# Patient Record
Sex: FEMALE | Race: WHITE | NOT HISPANIC OR LATINO | Employment: OTHER | URBAN - METROPOLITAN AREA
[De-identification: names, ages, dates, MRNs, and addresses within clinical notes are randomized per-mention and may not be internally consistent; named-entity substitution may affect disease eponyms.]

---

## 2020-02-19 RX ORDER — BISACODYL 10 MG
10 SUPPOSITORY, RECTAL RECTAL
COMMUNITY

## 2020-02-19 RX ORDER — METOPROLOL SUCCINATE 50 MG/1
50 TABLET, EXTENDED RELEASE ORAL DAILY
COMMUNITY
Start: 2019-10-25 | End: 2022-07-18

## 2020-02-19 RX ORDER — LISINOPRIL 20 MG/1
20 TABLET ORAL 2 TIMES DAILY
COMMUNITY
Start: 2020-01-02 | End: 2022-07-18

## 2020-02-19 RX ORDER — FAMOTIDINE 40 MG/1
TABLET, FILM COATED ORAL
COMMUNITY

## 2020-02-19 RX ORDER — LOSARTAN POTASSIUM AND HYDROCHLOROTHIAZIDE 12.5; 1 MG/1; MG/1
TABLET ORAL
COMMUNITY

## 2020-02-19 RX ORDER — ALENDRONATE SODIUM 70 MG/1
TABLET ORAL
COMMUNITY
Start: 2019-06-23

## 2020-02-19 RX ORDER — BUTALBITAL, ACETAMINOPHEN AND CAFFEINE 300; 40; 50 MG/1; MG/1; MG/1
CAPSULE ORAL
COMMUNITY

## 2020-02-19 RX ORDER — ATORVASTATIN CALCIUM 40 MG/1
TABLET, FILM COATED ORAL
COMMUNITY
Start: 2019-10-25

## 2020-02-19 RX ORDER — CLONIDINE HYDROCHLORIDE 0.1 MG/1
TABLET ORAL
COMMUNITY

## 2020-02-19 RX ORDER — HYDROCHLOROTHIAZIDE 25 MG/1
25 TABLET ORAL DAILY
COMMUNITY
Start: 2019-10-25 | End: 2022-07-18

## 2020-02-19 RX ORDER — TOPIRAMATE 50 MG/1
TABLET, FILM COATED ORAL
COMMUNITY
Start: 2018-08-13

## 2020-02-19 RX ORDER — PANTOPRAZOLE SODIUM 40 MG/1
TABLET, DELAYED RELEASE ORAL
COMMUNITY
Start: 2019-06-23

## 2020-02-19 RX ORDER — MELOXICAM 7.5 MG/1
7.5 TABLET ORAL DAILY
COMMUNITY
Start: 2019-06-23 | End: 2022-07-18

## 2020-02-20 ENCOUNTER — OFFICE VISIT (OUTPATIENT)
Dept: OBGYN CLINIC | Facility: CLINIC | Age: 75
End: 2020-02-20
Payer: COMMERCIAL

## 2020-02-20 VITALS — DIASTOLIC BLOOD PRESSURE: 93 MMHG | SYSTOLIC BLOOD PRESSURE: 137 MMHG | HEART RATE: 62 BPM | HEIGHT: 61 IN

## 2020-02-20 DIAGNOSIS — M25.462 EFFUSION OF LEFT KNEE: ICD-10-CM

## 2020-02-20 DIAGNOSIS — M25.562 LEFT KNEE PAIN, UNSPECIFIED CHRONICITY: ICD-10-CM

## 2020-02-20 DIAGNOSIS — M17.12 PRIMARY OSTEOARTHRITIS OF LEFT KNEE: Primary | ICD-10-CM

## 2020-02-20 PROCEDURE — 99203 OFFICE O/P NEW LOW 30 MIN: CPT | Performed by: ORTHOPAEDIC SURGERY

## 2020-02-20 PROCEDURE — 20610 DRAIN/INJ JOINT/BURSA W/O US: CPT | Performed by: ORTHOPAEDIC SURGERY

## 2020-02-20 RX ORDER — LIDOCAINE HYDROCHLORIDE 10 MG/ML
1 INJECTION, SOLUTION INFILTRATION; PERINEURAL
Status: COMPLETED | OUTPATIENT
Start: 2020-02-20 | End: 2020-02-20

## 2020-02-20 RX ORDER — BETAMETHASONE SODIUM PHOSPHATE AND BETAMETHASONE ACETATE 3; 3 MG/ML; MG/ML
12 INJECTION, SUSPENSION INTRA-ARTICULAR; INTRALESIONAL; INTRAMUSCULAR; SOFT TISSUE
Status: COMPLETED | OUTPATIENT
Start: 2020-02-20 | End: 2020-02-20

## 2020-02-20 RX ORDER — BUPIVACAINE HYDROCHLORIDE 5 MG/ML
2 INJECTION, SOLUTION EPIDURAL; INTRACAUDAL
Status: COMPLETED | OUTPATIENT
Start: 2020-02-20 | End: 2020-02-20

## 2020-02-20 RX ADMIN — BETAMETHASONE SODIUM PHOSPHATE AND BETAMETHASONE ACETATE 12 MG: 3; 3 INJECTION, SUSPENSION INTRA-ARTICULAR; INTRALESIONAL; INTRAMUSCULAR; SOFT TISSUE at 08:34

## 2020-02-20 RX ADMIN — BUPIVACAINE HYDROCHLORIDE 2 ML: 5 INJECTION, SOLUTION EPIDURAL; INTRACAUDAL at 08:34

## 2020-02-20 RX ADMIN — LIDOCAINE HYDROCHLORIDE 1 ML: 10 INJECTION, SOLUTION INFILTRATION; PERINEURAL at 08:34

## 2020-02-20 NOTE — PROGRESS NOTES
Assessment/Plan:  1  Primary osteoarthritis of left knee  Large joint arthrocentesis   2  Left knee pain, unspecified chronicity     3  Effusion of left knee  Large joint arthrocentesis     There is no problem list on file for this patient  Discussion/Summary:    69 y o  female  Left knee pain and effusion likely secondary to arthritic flare  20 cc of clear synovial fluid were aspirated today cortisone injection was delivered  Instructed patient give this injection up to 2 weeks to work if she is still having significant pain after 2 weeks she will follow up otherwise follow-up as needed  The patient was seen and examined by Dr Nyla Yung and myself  The assessment and plan were formulated by Dr Nyla Yung and I assisted in carrying it out  Subjective:   Patient ID: Mackenzie Zhao is a 69 y o  female   HPI    Patient presents to the office complaining of left knee pain  Symptoms began a month ago was walking the dogs and fell directly onto the leg/shin had immediate pain saw the [facility] ED had XRs that were read as negative for fracture, diagnosed  Pain is located anteromedial knee pain  Pain is described as stabbing and sharp, intermittent  The pain does not radiate   The pain is 8/10 at worst, 3/10 at best  It is made worse by flexion, weight bearing for extended time, she walks with a cane  It is made better by rest and elevation  So far the patient has tried ACE wrap, icy hot doesn't help, tylenol without relief, no NSAIDs she cant take those  The patient denies past episodes similar to this  The patient denies any numbness or tingling in the leg      The following portions of the patient's history were reviewed and updated as appropriate: allergies, current medications, past family history, past social history, past surgical history and problem list     Social History     Socioeconomic History    Marital status: Unknown     Spouse name: Not on file    Number of children: Not on file    Years of education: Not on file    Highest education level: Not on file   Occupational History    Not on file   Social Needs    Financial resource strain: Not on file    Food insecurity:     Worry: Not on file     Inability: Not on file    Transportation needs:     Medical: Not on file     Non-medical: Not on file   Tobacco Use    Smoking status: Never Smoker   Substance and Sexual Activity    Alcohol use: Not on file    Drug use: Not on file    Sexual activity: Not on file   Lifestyle    Physical activity:     Days per week: Not on file     Minutes per session: Not on file    Stress: Not on file   Relationships    Social connections:     Talks on phone: Not on file     Gets together: Not on file     Attends [employer] service: Not on file     Active member of club or organization: Not on file     Attends meetings of clubs or organizations: Not on file     Relationship status: Not on file    Intimate partner violence:     Fear of current or ex partner: Not on file     Emotionally abused: Not on file     Physically abused: Not on file     Forced sexual activity: Not on file   Other Topics Concern    Not on file   Social History Narrative    Not on file     No past medical history on file  No past surgical history on file    No Known Allergies  Current Outpatient Medications on File Prior to Visit   Medication Sig Dispense Refill    alendronate (FOSAMAX) 70 mg tablet alendronate 70 mg tablet      atorvastatin (LIPITOR) 40 mg tablet atorvastatin 40 mg tablet      hydrochlorothiazide (HYDRODIURIL) 25 mg tablet Take 25 mg by mouth daily      lisinopril (ZESTRIL) 20 mg tablet Take 20 mg by mouth 2 (two) times a day      magnesium oxide (MagOx 400) 400 mg Take 400 mg by mouth 2 (two) times a day      meloxicam (MOBIC) 7 5 mg tablet Take 7 5 mg by mouth daily      metoprolol succinate (TOPROL-XL) 50 mg 24 hr tablet Take 50 mg by mouth daily      pantoprazole (PROTONIX) 40 mg tablet pantoprazole 40 mg tablet,delayed release      topiramate (TOPAMAX) 50 MG tablet Take 1 tab nightly for a week and then 1 tab twice a day      bisacodyl (DULCOLAX) 10 mg suppository Insert 10 mg into the rectum      Butalbital-APAP-Caffeine -40 MG CAPS butalbital-acetaminophen-caffeine 50 mg-300 mg-40 mg capsule      cloNIDine (CATAPRES) 0 1 mg tablet clonidine HCl 0 1 mg tablet      famotidine (PEPCID) 40 MG tablet famotidine 40 mg tablet      losartan-hydrochlorothiazide (HYZAAR) 100-12 5 MG per tablet losartan 100 mg-hydrochlorothiazide 12 5 mg tablet       No current facility-administered medications on file prior to visit  Review of Systems   Constitutional: Negative for chills, fever and unexpected weight change  HENT: Negative for hearing loss, nosebleeds and sore throat  Eyes: Negative for pain, redness and visual disturbance  Respiratory: Negative for cough, shortness of breath and wheezing  Cardiovascular: Positive for leg swelling  Negative for chest pain and palpitations  Gastrointestinal: Negative for abdominal pain, nausea and vomiting  Endocrine: Positive for polydipsia and polyuria (attributes this to drinking lots of water)  Genitourinary: Negative for dysuria and hematuria  Musculoskeletal:         As noted in HPI   Skin: Negative for rash and wound  Neurological: Positive for headaches (being treated by PCP)  Negative for dizziness and numbness  Psychiatric/Behavioral: Negative for decreased concentration, dysphoric mood and suicidal ideas  The patient is not nervous/anxious  Objective:    Vitals:    02/20/20 0756   BP: 137/93   Pulse: 62       Physical Exam   Constitutional: She is oriented to person, place, and time  She appears well-developed and well-nourished  HENT:   Head: Normocephalic and atraumatic  Eyes: Conjunctivae are normal  No scleral icterus  Neck: Neck supple     Cardiovascular:   No discernible arrhthymias   Pulmonary/Chest: Effort normal  No stridor  No respiratory distress  Abdominal: Soft  She exhibits no distension  Musculoskeletal:        Left knee: She exhibits effusion  Neurological: She is alert and oriented to person, place, and time  Skin: Skin is warm and dry  No erythema  Psychiatric: She has a normal mood and affect  Her behavior is normal        Left Knee Exam     Muscle Strength   The patient has normal left knee strength  Tenderness   The patient is experiencing tenderness in the medial joint line  Range of Motion   The patient has normal left knee ROM  Tests   Brayden:  Medial - positive Lateral - negative  Varus: negative Valgus: negative    Other   Erythema: absent  Scars: absent  Sensation: normal  Pulse: present  Swelling: mild  Effusion: effusion present    Comments:  No ecchymosis or deformity   no warmth no ecchymosis skin is intact            I have personally reviewed pertinent films in PACS and my interpretation is   Outside x-rays of the left knee demonstrate no acute fracture, small joint effusion, mild degenerative changes in the medial compartment with osteophyte formation seen      Large joint arthrocentesis: L knee  Date/Time: 2/20/2020 8:34 AM  Consent given by: patient  Site marked: site marked  Timeout: Immediately prior to procedure a time out was called to verify the correct patient, procedure, equipment, support staff and site/side marked as required   Supporting Documentation  Indications: pain   Procedure Details  Location: knee - L knee  Preparation: Patient was prepped and draped in the usual sterile fashion  Needle size: 22 G  Approach: anterolateral  Medications administered: 1 mL lidocaine 1 %; 12 mg betamethasone acetate-betamethasone sodium phosphate 6 (3-3) mg/mL; 2 mL bupivacaine (PF) 0 5 %    Aspirate amount: 20 mL  Aspirate: clear and yellow    Patient tolerance: patient tolerated the procedure well with no immediate complications  Dressing:  Sterile dressing applied Portions of the record may have been created with voice recognition software  Occasional wrong word or "sound a like" substitutions may have occurred due to the inherent limitations of voice recognition software  Read the chart carefully and recognize, using context, where substitutions have occurred

## 2020-03-20 ENCOUNTER — TELEPHONE (OUTPATIENT)
Dept: OBGYN CLINIC | Facility: CLINIC | Age: 75
End: 2020-03-20

## 2020-03-20 NOTE — TELEPHONE ENCOUNTER
----- Message from [facility] sent at 3/20/2020  9:57 AM EDT -----  Regarding: FOLLOW UP  Patient would like someone to call her in reference to when she can be scheduled for a follow up visit for her knee  Last saw you in Feb, she said she had her knee drained, however it is still bothering her   Instructed to send a task to you per Claudette to make a decision on her follow up request  Thanks

## 2020-06-10 ENCOUNTER — OFFICE VISIT (OUTPATIENT)
Dept: OBGYN CLINIC | Facility: CLINIC | Age: 75
End: 2020-06-10
Payer: COMMERCIAL

## 2020-06-10 VITALS
BODY MASS INDEX: 34.21 KG/M2 | HEART RATE: 55 BPM | SYSTOLIC BLOOD PRESSURE: 107 MMHG | HEIGHT: 61 IN | WEIGHT: 181.2 LBS | DIASTOLIC BLOOD PRESSURE: 66 MMHG

## 2020-06-10 DIAGNOSIS — M22.2X2 PATELLOFEMORAL SYNDROME OF BOTH KNEES: ICD-10-CM

## 2020-06-10 DIAGNOSIS — M17.12 PRIMARY OSTEOARTHRITIS OF LEFT KNEE: Primary | ICD-10-CM

## 2020-06-10 DIAGNOSIS — M22.2X1 PATELLOFEMORAL SYNDROME OF BOTH KNEES: ICD-10-CM

## 2020-06-10 DIAGNOSIS — M62.9 HAMSTRING TIGHTNESS OF BOTH LOWER EXTREMITIES: ICD-10-CM

## 2020-06-10 DIAGNOSIS — R29.898 WEAKNESS OF BOTH HIPS: ICD-10-CM

## 2020-06-10 PROCEDURE — 20610 DRAIN/INJ JOINT/BURSA W/O US: CPT | Performed by: FAMILY MEDICINE

## 2020-06-10 PROCEDURE — 99213 OFFICE O/P EST LOW 20 MIN: CPT | Performed by: FAMILY MEDICINE

## 2020-06-10 RX ORDER — TRIAMCINOLONE ACETONIDE 40 MG/ML
40 INJECTION, SUSPENSION INTRA-ARTICULAR; INTRAMUSCULAR
Status: COMPLETED | OUTPATIENT
Start: 2020-06-10 | End: 2020-06-10

## 2020-06-10 RX ORDER — LIDOCAINE HYDROCHLORIDE 10 MG/ML
4 INJECTION, SOLUTION INFILTRATION; PERINEURAL
Status: COMPLETED | OUTPATIENT
Start: 2020-06-10 | End: 2020-06-10

## 2020-06-10 RX ORDER — LIDOCAINE HYDROCHLORIDE 10 MG/ML
5 INJECTION, SOLUTION INFILTRATION; PERINEURAL
Status: COMPLETED | OUTPATIENT
Start: 2020-06-10 | End: 2020-06-10

## 2020-06-10 RX ORDER — ASPIRIN 81 MG/1
81 TABLET, CHEWABLE ORAL DAILY
COMMUNITY
Start: 2020-03-06 | End: 2022-07-18

## 2020-06-10 RX ADMIN — LIDOCAINE HYDROCHLORIDE 4 ML: 10 INJECTION, SOLUTION INFILTRATION; PERINEURAL at 14:30

## 2020-06-10 RX ADMIN — TRIAMCINOLONE ACETONIDE 40 MG: 40 INJECTION, SUSPENSION INTRA-ARTICULAR; INTRAMUSCULAR at 14:30

## 2020-06-10 RX ADMIN — LIDOCAINE HYDROCHLORIDE 5 ML: 10 INJECTION, SOLUTION INFILTRATION; PERINEURAL at 14:30

## 2020-07-08 ENCOUNTER — EVALUATION (OUTPATIENT)
Dept: PHYSICAL THERAPY | Facility: CLINIC | Age: 75
End: 2020-07-08
Payer: COMMERCIAL

## 2020-07-08 DIAGNOSIS — M17.12 PRIMARY OSTEOARTHRITIS OF LEFT KNEE: ICD-10-CM

## 2020-07-08 DIAGNOSIS — R29.898 WEAKNESS OF BOTH HIPS: ICD-10-CM

## 2020-07-08 DIAGNOSIS — M62.9 HAMSTRING TIGHTNESS OF BOTH LOWER EXTREMITIES: ICD-10-CM

## 2020-07-08 DIAGNOSIS — M22.2X1 PATELLOFEMORAL SYNDROME OF BOTH KNEES: ICD-10-CM

## 2020-07-08 DIAGNOSIS — M22.2X2 PATELLOFEMORAL SYNDROME OF BOTH KNEES: ICD-10-CM

## 2020-07-08 PROCEDURE — 97162 PT EVAL MOD COMPLEX 30 MIN: CPT | Performed by: PHYSICAL THERAPIST

## 2020-07-08 PROCEDURE — 97110 THERAPEUTIC EXERCISES: CPT | Performed by: PHYSICAL THERAPIST

## 2020-07-08 NOTE — PROGRESS NOTES
PT Evaluation     Today's date: 2020  Patient name: Velia Lay  : [birth year]  MRN: [id number]  Referring provider: Prachi Saavedra DO  Dx:   Encounter Diagnosis     ICD-10-CM    1  Primary osteoarthritis of left knee M17 12 Ambulatory referral to Physical Therapy   2  Patellofemoral syndrome of both knees M22 2X1 Ambulatory referral to Physical Therapy    M22 2X2    3  Weakness of both hips R29 898 Ambulatory referral to Physical Therapy   4  Hamstring tightness of both lower extremities M62 9 Ambulatory referral to Physical Therapy                  Assessment  Assessment details: Patient is a 70 y/o female with chief complaints of left knee pain following a fall in 2020  Patient presents with decreased functional mobility due to increased pain, decreased strength, decreased knee ROM, gait deviations requiring SPC associated with knee OA  Cannot rule out ligamentous or meniscal injury at this time  Patient will benefit from skilled physical therapy to address impairment and improve functional mobility  PT needed to allow for return to maximal function and improve quality of life  Impairments: abnormal or restricted ROM, activity intolerance, impaired physical strength, lacks appropriate home exercise program and pain with function  Understanding of Dx/Px/POC: good   Prognosis: good    Goals  STG within 4 weeks:   1  Patient to be independent in HEP  2  Reduce pain by 50% to improve quality of life  3  Patient to improve left knee flexion to 110 degrees  4  Improve left knee extension to 0 degrees  LTG within 8 weeks:   1  Patient to be independent in ADLs/IADLs without difficulty  2  Patient to be able to ambulate community distances with minimal difficulty  3  Patient to be able to perform reciprocal stairs with minimal difficulty       Plan  Patient would benefit from: skilled physical therapy and PT eval  Planned modality interventions: cryotherapy, hydrotherapy and unattended electrical stimulation  Planned therapy interventions: therapeutic training, therapeutic exercise, therapeutic activities, stretching, strengthening, postural training, patient education, neuromuscular re-education, manual therapy, joint mobilization, IADL retraining, activity modification, ADL retraining, ADL training, body mechanics training, flexibility, functional ROM exercises, gait training, graded activity, graded exercise, graded motor and home exercise program  Frequency: 2x week  Duration in weeks: 8  Plan of Care beginning date: 2020  Plan of Care expiration date: 2020  Treatment plan discussed with: patient        Subjective Evaluation    History of Present Illness  Mechanism of injury: Patient is a 68 y/o female s/p a fall in 2020 when she was walking her dogs  She states she went to the ER on 2020 and xray was negative for fracture  She was referred to ortho on 20 and had fluid drained from her knee  She was seen again by ortho on 6/10/20 and had fluid drained and a cortisone injection  She states that helped for about two weeks  She states she feels a lot of pressure in her knee and cramping  Prior to this, she denies any AD  Today, she ambulates with SPC  She reports clicking and occasional giving out of the knee  She is referred for evaluation and treatment of left knee pain  Pain  Current pain ratin  At best pain ratin  At worst pain ratin  Quality: cramping, pressure and sharp  Progression: worsening    Social Support  Steps to enter house: yes  Stairs in house: no   Lives in: apartment  Lives with: patient's oldest granddaugther     Employment status: working  Hand dominance: left  Exercise history: walking the dogs   Life stress: medium       Diagnostic Tests  Abnormal x-ray: No xray report available, but patient states she was diagnosed with osteoarthritis    Treatments  Previous treatment: injection treatment and medication  Patient Goals  Patient goals for therapy: decreased pain, increased motion, increased strength and independence with ADLs/IADLs  Patient goal: "to be able to walk better"         Objective     Active Range of Motion   Left Knee   Flexion: 65 degrees   Extension: -10 degrees   Extensor lag: -15 degrees     Right Knee   Flexion: 95 degrees   Extension: -5 degrees     Additional Active Range of Motion Details  Unable to perform SLR on right     Strength/Myotome Testing     Left Knee   Flexion: 3  Extension: 3  Quadriceps contraction: poor    Right Knee   Flexion: 4+  Extension: 4+  Quadriceps contraction: fair    Additional Strength Details  Pain with active extension and flexion; pain with resistance testing    Tests     Additional Tests Details  Unable to perform special testing due to significant pain during assessment and time constraint during assessment  Flowsheet Rows      Most Recent Value   PT/OT G-Codes   Current Score  50   Projected Score  62             Precautions: language barrier, high cholesterol, high blood pressure, h/o MI and cardiac bypass, patient tolerance     Increased time spent on patient education with diagnosis, prognosis, goals of therapy, progression of therapy, and plan of care  All questions answered  Patient instructed to call clinic with questions or concerns        Manuals 7/8                                                                Neuro Re-Ed             Quad set 3"x20            Towel crush 3"x20                                                                             Ther Ex             Gastroc stretch w strap 4x15"            Recumbent bike (NV) (start at 5 min)           Hamstring stretch  NV            NV: adductor set, glute set, SLR, clamshell, HR NV                                                                Ther Activity                                       Gait Training                                       Modalities             Cold pack- L knee decline Assessment: Patient has no complaints post session  She declines ice, but instructed to ice at home 2-3 times per day, 10-15 minutes

## 2020-07-10 ENCOUNTER — OFFICE VISIT (OUTPATIENT)
Dept: PHYSICAL THERAPY | Facility: CLINIC | Age: 75
End: 2020-07-10
Payer: COMMERCIAL

## 2020-07-10 DIAGNOSIS — R29.898 WEAKNESS OF BOTH HIPS: ICD-10-CM

## 2020-07-10 DIAGNOSIS — M22.2X1 PATELLOFEMORAL SYNDROME OF BOTH KNEES: ICD-10-CM

## 2020-07-10 DIAGNOSIS — M22.2X2 PATELLOFEMORAL SYNDROME OF BOTH KNEES: ICD-10-CM

## 2020-07-10 DIAGNOSIS — M62.9 HAMSTRING TIGHTNESS OF BOTH LOWER EXTREMITIES: ICD-10-CM

## 2020-07-10 DIAGNOSIS — M17.12 PRIMARY OSTEOARTHRITIS OF LEFT KNEE: Primary | ICD-10-CM

## 2020-07-10 PROCEDURE — 97140 MANUAL THERAPY 1/> REGIONS: CPT | Performed by: PHYSICAL THERAPIST

## 2020-07-10 PROCEDURE — 97110 THERAPEUTIC EXERCISES: CPT | Performed by: PHYSICAL THERAPIST

## 2020-07-10 NOTE — PROGRESS NOTES
Daily Note     Today's date: 7/10/2020  Patient name: Naveen Blanton  :   MRN: [id number]  Referring provider: Nicky Chahal DO  Dx:   Encounter Diagnosis     ICD-10-CM    1  Primary osteoarthritis of left knee M17 12    2  Patellofemoral syndrome of both knees M22 2X1     M22 2X2    3  Weakness of both hips R29 898    4  Hamstring tightness of both lower extremities M62 9                   Subjective: Patient denies any new complaints  Objective: See treatment diary below      Assessment: Tolerated treatment well  Unable to make full revolutions on bike, but improves to 100 degrees of left knee flexion with heel slides  She exhibits tenderness to left medial knee, but good response to IASTM  Less pain from start to finish of session  Patient would benefit from continued PT      Plan: Continue per plan of care        Precautions: language barrier, high cholesterol, high blood pressure, h/o MI and cardiac bypass, patient tolerance         Manuals 7/8 7/10           IASTM L medial knee  8'                                                  Neuro Re-Ed             Quad set 3"x20 3"x20           Towel crush 3"x20                                                                             Ther Ex             Gastroc stretch w strap 4x15" 4x15"           Recumbent bike rocking  (NV) 5 min           Hamstring stretch  NV By PT 5 x 20"           SLR Left   w PT assist 2 x 10           LAQ  2x10 w strap assist           Heel slides  x15           NV: adductor set, glute set,  clamshell, HR NV                                                                Ther Activity                                       Gait Training                                       Modalities             Cold pack- L knee decline 10'

## 2020-07-13 ENCOUNTER — OFFICE VISIT (OUTPATIENT)
Dept: PHYSICAL THERAPY | Facility: CLINIC | Age: 75
End: 2020-07-13
Payer: COMMERCIAL

## 2020-07-13 DIAGNOSIS — M17.12 PRIMARY OSTEOARTHRITIS OF LEFT KNEE: Primary | ICD-10-CM

## 2020-07-13 DIAGNOSIS — M62.9 HAMSTRING TIGHTNESS OF BOTH LOWER EXTREMITIES: ICD-10-CM

## 2020-07-13 DIAGNOSIS — M22.2X1 PATELLOFEMORAL SYNDROME OF BOTH KNEES: ICD-10-CM

## 2020-07-13 DIAGNOSIS — M22.2X2 PATELLOFEMORAL SYNDROME OF BOTH KNEES: ICD-10-CM

## 2020-07-13 DIAGNOSIS — R29.898 WEAKNESS OF BOTH HIPS: ICD-10-CM

## 2020-07-13 PROCEDURE — 97110 THERAPEUTIC EXERCISES: CPT | Performed by: PHYSICAL THERAPIST

## 2020-07-13 PROCEDURE — 97140 MANUAL THERAPY 1/> REGIONS: CPT | Performed by: PHYSICAL THERAPIST

## 2020-07-13 NOTE — PROGRESS NOTES
Daily Note     Today's date: 2020  Patient name: Frida Akbar  :   MRN: [id number]  Referring provider: Duane Javier DO  Dx:   Encounter Diagnosis     ICD-10-CM    1  Primary osteoarthritis of left knee M17 12    2  Patellofemoral syndrome of both knees M22 2X1     M22 2X2    3  Weakness of both hips R29 898    4  Hamstring tightness of both lower extremities M62 9                   Subjective: Patient states she felt better for a day after last visit  She reports her knee is still clicking  Objective: See treatment diary below      Assessment: Tolerated treatment well  Patient able to make full revolutions on bike this visit, which is improved from last visit  She achieves 112 degrees of knee flexion this visit  Most difficulty with open chain quad exercises  Held on SAQ due to pain  Patient would benefit from continued PT Less pain from start to finish of session  Plan: Continue per plan of care        Precautions: language barrier, high cholesterol, high blood pressure, h/o MI and cardiac bypass, patient tolerance         Manuals 7/8 7/10 7/13          IASTM L medial knee  8' 8'                                                 Neuro Re-Ed             Quad set 3"x20 3"x20 3"x20          Towel crush 3"x20  3"X20          SAQ   unable                                                              Ther Ex             Gastroc stretch w strap 4x15" 4x15" 4x15"          Recumbent bike rocking  (NV) 5 min Full rev 5 min          Hamstring stretch  NV By PT 5 x 20"           SLR Left   w PT assist 2 x 10 w strap 2 x 10          LAQ  2x10 w strap assist w strap 2 x 10          Heel slides  x15 5"x15           adductor set NV  3"x20          Heel raises   x20          Standing hamstring curl   x20           Standing hip abd   2x10                       Ther Activity                                       Gait Training                                       Modalities Cold pack- L knee decline 10' 10'

## 2020-07-15 ENCOUNTER — OFFICE VISIT (OUTPATIENT)
Dept: PHYSICAL THERAPY | Facility: CLINIC | Age: 75
End: 2020-07-15
Payer: COMMERCIAL

## 2020-07-15 DIAGNOSIS — M62.9 HAMSTRING TIGHTNESS OF BOTH LOWER EXTREMITIES: ICD-10-CM

## 2020-07-15 DIAGNOSIS — M17.12 PRIMARY OSTEOARTHRITIS OF LEFT KNEE: Primary | ICD-10-CM

## 2020-07-15 DIAGNOSIS — R29.898 WEAKNESS OF BOTH HIPS: ICD-10-CM

## 2020-07-15 DIAGNOSIS — M22.2X1 PATELLOFEMORAL SYNDROME OF BOTH KNEES: ICD-10-CM

## 2020-07-15 DIAGNOSIS — M22.2X2 PATELLOFEMORAL SYNDROME OF BOTH KNEES: ICD-10-CM

## 2020-07-15 PROCEDURE — 97110 THERAPEUTIC EXERCISES: CPT

## 2020-07-15 NOTE — PROGRESS NOTES
Daily Note     Today's date: 7/15/2020  Patient name: Velia Lay  :   MRN: [id number]  Referring provider: Prachi Saavedra DO  Dx:   Encounter Diagnosis     ICD-10-CM    1  Primary osteoarthritis of left knee M17 12    2  Patellofemoral syndrome of both knees M22 2X1     M22 2X2    3  Weakness of both hips R29 898    4  Hamstring tightness of both lower extremities M62 9                   Subjective: Pt arrived 30 minutes late and was accommodated  Pt states she is feeling a bit better but continues to be very sore but she is not needing her cane as much anymore  Objective: See treatment diary below      Assessment: Pt progressed through exercises with remaining time of her appointment  Pt demonstrates improvements as she was able to tolerated increased pressure with IASTM and was able to ambulate around facility with no cane  Pt will finish the remaining exercisess at home  Pt will resume full program next session  Pt would continue to benefit from PT  Plan: Continue per plan of care        Precautions: language barrier, high cholesterol, high blood pressure, h/o MI and cardiac bypass, patient tolerance         Manuals 7/8 7/10 7/13 7/15          IASTM L medial knee  8' 8' CF 8'                                                 Neuro Re-Ed             Quad set 3"x20 3"x20 3"x20 3" x 20          Towel crush 3"x20  3"X20 nv         SAQ   unable                                                              Ther Ex             Gastroc stretch w strap 4x15" 4x15" 4x15" 4 x15"         Recumbent bike rocking  (NV) 5 min Full rev 5 min Full 5 min         Hamstring stretch  NV By PT 5 x 20"           SLR Left   w PT assist 2 x 10 w strap 2 x 10 nv         LAQ  2x10 w strap assist w strap 2 x 10 nv         Heel slides  x15 5"x15 5" x 15           adductor set NV  3"x20 nv         Heel raises   x20 20x         Standing hamstring curl   x20  20x          Standing hip abd   2x10 2 x 10 Ther Activity                                       Gait Training                                       Modalities             Cold pack- L knee decline 10' 10' 10'

## 2020-07-17 ENCOUNTER — APPOINTMENT (OUTPATIENT)
Dept: PHYSICAL THERAPY | Facility: CLINIC | Age: 75
End: 2020-07-17
Payer: COMMERCIAL

## 2020-07-20 ENCOUNTER — OFFICE VISIT (OUTPATIENT)
Dept: PHYSICAL THERAPY | Facility: CLINIC | Age: 75
End: 2020-07-20
Payer: COMMERCIAL

## 2020-07-20 DIAGNOSIS — M22.2X1 PATELLOFEMORAL SYNDROME OF BOTH KNEES: ICD-10-CM

## 2020-07-20 DIAGNOSIS — M62.9 HAMSTRING TIGHTNESS OF BOTH LOWER EXTREMITIES: ICD-10-CM

## 2020-07-20 DIAGNOSIS — R29.898 WEAKNESS OF BOTH HIPS: ICD-10-CM

## 2020-07-20 DIAGNOSIS — M22.2X2 PATELLOFEMORAL SYNDROME OF BOTH KNEES: ICD-10-CM

## 2020-07-20 DIAGNOSIS — M17.12 PRIMARY OSTEOARTHRITIS OF LEFT KNEE: Primary | ICD-10-CM

## 2020-07-20 PROCEDURE — 97112 NEUROMUSCULAR REEDUCATION: CPT | Performed by: PHYSICAL THERAPIST

## 2020-07-20 PROCEDURE — 97140 MANUAL THERAPY 1/> REGIONS: CPT | Performed by: PHYSICAL THERAPIST

## 2020-07-20 PROCEDURE — 97110 THERAPEUTIC EXERCISES: CPT | Performed by: PHYSICAL THERAPIST

## 2020-07-20 NOTE — PROGRESS NOTES
Daily Note     Today's date: 2020  Patient name: Jacqueline Segovia  :   MRN: [id number]  Referring provider: Armen Rodney DO  Dx:   Encounter Diagnosis     ICD-10-CM    1  Primary osteoarthritis of left knee M17 12    2  Patellofemoral syndrome of both knees M22 2X1     M22 2X2    3  Weakness of both hips R29 898    4  Hamstring tightness of both lower extremities M62 9                   Subjective: Pt states she is doing much better  She no longer wraps her knee and no longer uses a cane for ambulation  She is having less pain, but remaining pain is located at medial aspect of left knee  Objective: See treatment diary below      Assessment: Patient now able to perform LAQ and SLR without strap assist, indicating improved strength  Improved ROM noted as well, during heel slides and quad sets  Updated FOTO, which showed significantly improved functional mobility  Discussed continuation of skilled PT for several more sessions to continue to implement HEP, prior to d/c to HEP  Patient should follow up with referring physician for viscosupplementation as he recommended previously, if indicated  Plan: Continue per plan of care        Precautions: language barrier, high cholesterol, high blood pressure, h/o MI and cardiac bypass, patient tolerance         Manuals 7/8 7/10 7/13 7/15  7/20        IASTM L medial knee  8' 8' CF 8'  8'                                               Neuro Re-Ed             Quad set 3"x20 3"x20 3"x20 3" x 20  3"x20        Towel crush 3"x20  3"X20 nv         SAQ   unable  2x10                                                            Ther Ex             Gastroc stretch w strap 4x15" 4x15" 4x15" 4 x15" 4x20"        Recumbent bike rocking  (NV) 5 min Full rev 5 min Full 5 min Full 8'        Hamstring stretch  NV By PT 5 x 20"           SLR Left   w PT assist 2 x 10 w strap 2 x 10 nv B 1# 2 x 10        LAQ  2x10 w strap assist w strap 2 x 10 nv X10; 1# 2x10        Heel slides  x15 5"x15 5" x 15  5"x15         adductor set NV  3"x20 nv 3"x20        Heel raises   x20 20x x20        Standing hamstring curl   x20  20x  x20        Standing hip abd   2x10 2 x 10  Red 2 x 10 ea        Standing hip extension     Red 2 x10 ea                     Ther Activity                                       Gait Training                                       Modalities             Cold pack- L knee decline 10' 10' 10' 10'

## 2020-07-22 ENCOUNTER — APPOINTMENT (OUTPATIENT)
Dept: PHYSICAL THERAPY | Facility: CLINIC | Age: 75
End: 2020-07-22
Payer: COMMERCIAL

## 2020-07-25 ENCOUNTER — TELEPHONE (OUTPATIENT)
Dept: OBGYN CLINIC | Facility: MEDICAL CENTER | Age: 75
End: 2020-07-25

## 2020-07-25 NOTE — TELEPHONE ENCOUNTER
TO BE COMPLETED BY CENTRAL AUTH TEAM:     Physician: NIKOLAS    Medication: SYNVIISC ONE    Number of Injections in Series (Appointments scheduled 1 week apart from one another):   >>> 1    Schedule after this date:   UPON AVAILABILITY    Billing Info: Buy and Bill/Specialty Pharmacy-Patient Supply  BUY AND BILL    Appointment Message Line:  JUSTEN MARSH [phone]     (please copy and paste appointment message line when scheduling appointment)    Additional Comments:    Please schedule last appt before lunch or last appointment of the day for Dr Lisha Amaya

## 2020-07-28 ENCOUNTER — OFFICE VISIT (OUTPATIENT)
Dept: PHYSICAL THERAPY | Facility: CLINIC | Age: 75
End: 2020-07-28
Payer: COMMERCIAL

## 2020-07-28 DIAGNOSIS — M22.2X2 PATELLOFEMORAL SYNDROME OF BOTH KNEES: ICD-10-CM

## 2020-07-28 DIAGNOSIS — R29.898 WEAKNESS OF BOTH HIPS: ICD-10-CM

## 2020-07-28 DIAGNOSIS — M17.12 PRIMARY OSTEOARTHRITIS OF LEFT KNEE: Primary | ICD-10-CM

## 2020-07-28 DIAGNOSIS — M62.9 HAMSTRING TIGHTNESS OF BOTH LOWER EXTREMITIES: ICD-10-CM

## 2020-07-28 DIAGNOSIS — M22.2X1 PATELLOFEMORAL SYNDROME OF BOTH KNEES: ICD-10-CM

## 2020-07-28 PROCEDURE — 97112 NEUROMUSCULAR REEDUCATION: CPT | Performed by: PHYSICAL THERAPIST

## 2020-07-28 PROCEDURE — 97110 THERAPEUTIC EXERCISES: CPT | Performed by: PHYSICAL THERAPIST

## 2020-07-28 NOTE — PROGRESS NOTES
Daily Note     Today's date: 2020  Patient name: Frida Akbar  :   MRN: [id number]  Referring provider: Duane Javier DO  Dx:   Encounter Diagnosis     ICD-10-CM    1  Primary osteoarthritis of left knee M17 12    2  Patellofemoral syndrome of both knees M22 2X1     M22 2X2    3  Weakness of both hips R29 898    4  Hamstring tightness of both lower extremities M62 9        Start Time: 945  Stop Time:   Total time in clinic (min): 55 minutes    Subjective: Pt reports that she is doing better  Objective: See treatment diary below      Assessment: Pt demonstrated major difficulty with quad recruitment during quad sets and required a towel for feedback back in order to properly facilitate the quads  Pt demonstrated poor recall of exercise and required extensive cues in order to be able to complete her full program        Plan: Continue per plan of care        Precautions: language barrier, high cholesterol, high blood pressure, h/o MI and cardiac bypass, patient tolerance         Manuals 7/8 7/10 7/13 7/15  7/20 7/28       IASTM L medial knee  8' 8' CF 8'  8' 8'                                              Neuro Re-Ed             Quad set 3"x20 3"x20 3"x20 3" x 20  3"x20 3"x20       Towel crush 3"x20  3"X20 nv         SAQ   unable  2x10 3"x20 #1                                                            Ther Ex             Gastroc stretch w strap 4x15" 4x15" 4x15" 4 x15" 4x20" 4x20"       Recumbent bike rocking  (NV) 5 min Full rev 5 min Full 5 min Full 8' Full 8'       Hamstring stretch  NV By PT 5 x 20"           SLR Left   w PT assist 2 x 10 w strap 2 x 10 nv B 1# 2 x 10 B 1# 2 x 10       LAQ  2x10 w strap assist w strap 2 x 10 nv X10; 1# 2x10 1# 2x10       Heel slides  x15 5"x15 5" x 15  5"x15 5"x15        adductor set NV  3"x20 nv 3"x20 3"x20       Heel raises   x20 20x x20 x20       Standing hamstring curl   x20  20x  x20 x20       Standing hip abd   2x10 2 x 10  Red 2 x 10 ea Red 2 x 10 ea       Standing hip extension     Red 2 x10 ea Red 2 x10 ea                    Ther Activity                                       Gait Training                                       Modalities             Cold pack- L knee decline 10' 10' 10' 10' 10'

## 2020-08-03 ENCOUNTER — OFFICE VISIT (OUTPATIENT)
Dept: PHYSICAL THERAPY | Facility: CLINIC | Age: 75
End: 2020-08-03
Payer: COMMERCIAL

## 2020-08-03 DIAGNOSIS — M22.2X2 PATELLOFEMORAL SYNDROME OF BOTH KNEES: ICD-10-CM

## 2020-08-03 DIAGNOSIS — M22.2X1 PATELLOFEMORAL SYNDROME OF BOTH KNEES: ICD-10-CM

## 2020-08-03 DIAGNOSIS — M62.9 HAMSTRING TIGHTNESS OF BOTH LOWER EXTREMITIES: ICD-10-CM

## 2020-08-03 DIAGNOSIS — R29.898 WEAKNESS OF BOTH HIPS: ICD-10-CM

## 2020-08-03 DIAGNOSIS — M17.12 PRIMARY OSTEOARTHRITIS OF LEFT KNEE: Primary | ICD-10-CM

## 2020-08-03 PROCEDURE — 97112 NEUROMUSCULAR REEDUCATION: CPT | Performed by: PHYSICAL THERAPIST

## 2020-08-03 PROCEDURE — 97110 THERAPEUTIC EXERCISES: CPT | Performed by: PHYSICAL THERAPIST

## 2020-08-03 NOTE — PROGRESS NOTES
Daily Note     Today's date: 8/3/2020  Patient name: Mirta Harper  : [birth year]  MRN: [id number]  Referring provider: Sourav Silver DO  Dx:   Encounter Diagnosis     ICD-10-CM    1  Primary osteoarthritis of left knee  M17 12    2  Patellofemoral syndrome of both knees  M22 2X1     M22 2X2    3  Weakness of both hips  R29 898    4  Hamstring tightness of both lower extremities  M62 9                   Subjective: Pt states her knee was a little sore last week, but states she wasn't able to attend her second appointment last week  Objective: See treatment diary below      Assessment: Pt continues with difficulty engaging quad, but improved ability in long sit compared to supine  Encouraged to progress HEP at home and provided written HEP  Improved mobility and less pain from start to finish of session  Plan: Continue per plan of care        Precautions: language barrier, high cholesterol, high blood pressure, h/o MI and cardiac bypass, patient tolerance         Manuals 7/8 7/10 7/13 7/15  7/20 7/28 8/3      IASTM L medial knee  8' 8' CF 8'  8' 8' 8'                                             Neuro Re-Ed             Quad set 3"x20 3"x20 3"x20 3" x 20  3"x20 3"x20 3"x20      Towel crush 3"x20  3"X20 nv         SAQ   unable  2x10 3"x20 #1  2# x20      biodex LOS       lv 11 x5                                             Ther Ex             Gastroc stretch w strap 4x15" 4x15" 4x15" 4 x15" 4x20" 4x20" Wall 3 x 20" ea      Recumbent bike rocking  (NV) 5 min Full rev 5 min Full 5 min Full 8' Full 8' 10'      Hamstring stretch  NV By PT 5 x 20"           SLR Left   w PT assist 2 x 10 w strap 2 x 10 nv B 1# 2 x 10 B 1# 2 x 10 B 2# 2 x10 ea      LAQ  2x10 w strap assist w strap 2 x 10 nv X10; 1# 2x10 1# 2x10 2# 2 x 10 ea      Heel slides  x15 5"x15 5" x 15  5"x15 5"x15 5"x20       adductor set NV  3"x20 nv 3"x20 3"x20 3"x20      Heel raises   x20 20x x20 x20 x20      Standing hamstring curl   x20 20x  x20 x20 x20      Standing hip abd   2x10 2 x 10  Red 2 x 10 ea Red 2 x 10 ea Red 2 x10 ea      Standing hip extension     Red 2 x10 ea Red 2 x10 ea Red 2 x 10 ea                   Ther Activity                                       Gait Training                                       Modalities             Cold pack- L knee decline 10' 10' 10' 10' 10' 10'

## 2020-08-06 ENCOUNTER — OFFICE VISIT (OUTPATIENT)
Dept: PHYSICAL THERAPY | Facility: CLINIC | Age: 75
End: 2020-08-06
Payer: COMMERCIAL

## 2020-08-06 DIAGNOSIS — M22.2X2 PATELLOFEMORAL SYNDROME OF BOTH KNEES: ICD-10-CM

## 2020-08-06 DIAGNOSIS — M22.2X1 PATELLOFEMORAL SYNDROME OF BOTH KNEES: ICD-10-CM

## 2020-08-06 DIAGNOSIS — M17.12 PRIMARY OSTEOARTHRITIS OF LEFT KNEE: Primary | ICD-10-CM

## 2020-08-06 DIAGNOSIS — R29.898 WEAKNESS OF BOTH HIPS: ICD-10-CM

## 2020-08-06 DIAGNOSIS — M62.9 HAMSTRING TIGHTNESS OF BOTH LOWER EXTREMITIES: ICD-10-CM

## 2020-08-06 PROCEDURE — 97110 THERAPEUTIC EXERCISES: CPT | Performed by: PHYSICAL THERAPIST

## 2020-08-06 PROCEDURE — 97140 MANUAL THERAPY 1/> REGIONS: CPT | Performed by: PHYSICAL THERAPIST

## 2020-08-06 PROCEDURE — 97112 NEUROMUSCULAR REEDUCATION: CPT | Performed by: PHYSICAL THERAPIST

## 2020-08-06 NOTE — PROGRESS NOTES
PT Re-Evaluation     Today's date: 2020  Patient name: Velia Lay  : 37  MRN: [id number]  Referring provider: Prachi Saavedra DO  Dx:   Encounter Diagnosis     ICD-10-CM    1  Primary osteoarthritis of left knee  M17 12    2  Patellofemoral syndrome of both knees  M22 2X1     M22 2X2    3  Weakness of both hips  R29 898    4  Hamstring tightness of both lower extremities  M62 9                   Assessment  Assessment details: Patient is a 70 y/o female with chief complaints of left knee pain following a fall in 2020  Since starting therapy, she presents with improved functional mobility, decreased pain intensity and frequency, improved knee strength and ROM, and improved gait pattern  He remains with impairments of pain, decreased strength, mildly decreased knee ROM associated with knee OA  Cannot rule out ligamentous or meniscal injury at this time  Patient will benefit from continued skilled physical therapy to address impairment and improve functional mobility  PT continues to be needed to allow for return to maximal function and improve quality of life  Impairments: abnormal or restricted ROM, activity intolerance, impaired physical strength, lacks appropriate home exercise program and pain with function  Understanding of Dx/Px/POC: good   Prognosis: good    Goals  STG within 4 weeks:   1  Patient to be independent in HEP  MET  2  Reduce pain by 50% to improve quality of life  PARTIALLY MET   3  Patient to improve left knee flexion to 110 degrees  MET  4  Improve left knee extension to 0 degrees  MET  LTG within 8 weeks:   1  Patient to be independent in ADLs/IADLs without difficulty  PARTIALLY MET  2  Patient to be able to ambulate community distances with minimal difficulty  NOT MET  3  Patient to be able to perform reciprocal stairs with minimal difficulty   NOT MET    Plan  Patient would benefit from: skilled physical therapy and PT eval  Planned modality interventions: cryotherapy, hydrotherapy, unattended electrical stimulation, H-Wave and TENS  Planned therapy interventions: therapeutic training, therapeutic exercise, therapeutic activities, stretching, strengthening, postural training, patient education, neuromuscular re-education, manual therapy, joint mobilization, IADL retraining, activity modification, ADL retraining, ADL training, body mechanics training, flexibility, functional ROM exercises, gait training, graded activity, graded exercise, graded motor and home exercise program  Frequency: 2x week  Duration in weeks: 8  Plan of Care beginning date: 2020  Plan of Care expiration date: 2020  Treatment plan discussed with: patient        Subjective Evaluation    History of Present Illness  Mechanism of injury: Patient is a 70 y/o female s/p a fall in 2020 when she was walking her dogs  She states she went to the ER on 2020 and xray was negative for fracture  She was referred to ortho on 20 and had fluid drained from her knee  She was seen again by ortho on 6/10/20 and had fluid drained and a cortisone injection  She states that helped for about two weeks  She states she feels a lot of pressure in her knee and cramping  Prior to this, she denies any AD  Today, she ambulates with SPC  She reports clicking and occasional giving out of the knee  She is referred for evaluation and treatment of left knee pain  20: Since starting therapy, patient reports there is time when she has zero pain  Her instances of pain are less frequent, as well  She is no longer walking with the cane or wrapping her knee with an ace bandage  She no longer has any clicking or popping in the knee     Pain  Current pain ratin  At best pain ratin  At worst pain ratin  Quality: cramping, pressure and sharp  Progression: improved    Social Support  Steps to enter house: yes  Stairs in house: no   Lives in: apartment  Lives with: patient's oldest granddaugther     Employment status: working  Hand dominance: left  Exercise history: walking the dogs   Life stress: medium       Diagnostic Tests  Abnormal x-ray: No xray report available, but patient states she was diagnosed with osteoarthritis    Treatments  Previous treatment: injection treatment and medication  Patient Goals  Patient goals for therapy: decreased pain, increased motion, increased strength and independence with ADLs/IADLs  Patient goal: "to be able to walk better"         Objective     Active Range of Motion   Left Knee   Flexion: 110 degrees   Extension: 0 degrees   Extensor la degrees     Right Knee   Flexion: 95 degrees   Extension: -5 degrees     Strength/Myotome Testing     Left Knee   Flexion: 4-  Extension: 4-  Quadriceps contraction: fair    Right Knee   Flexion: 4+  Extension: 4+  Quadriceps contraction: fair             Precautions: language barrier, high cholesterol, high blood pressure, h/o MI and cardiac bypass, patient tolerance         Manuals 7/8 7/10 7/13 7/15  7/20 7/28 8/3 86     IASTM L medial knee  8' 8' CF 8'  8' 8' 8' 8'     L Ant tibial glide         5'                               Neuro Re-Ed             Quad set 3"x20 3"x20 3"x20 3" x 20  3"x20 3"x20 3"x20      Towel crush 3"x20  3"X20 nv         SAQ   unable  2x10 3"x20 #1  2# x20      biodex LOS       lv 11 x5 Lv 11 x5     Standing theraband press back        Red x 20 ea                               Ther Ex             Gastroc stretch w strap 4x15" 4x15" 4x15" 4 x15" 4x20" 4x20" Wall 3 x 20" ea      Recumbent bike rocking  (NV) 5 min Full rev 5 min Full 5 min Full 8' Full 8' 10' 10'     Hamstring stretch  NV By PT 5 x 20"           SLR Left   w PT assist 2 x 10 w strap 2 x 10 nv B 1# 2 x 10 B 1# 2 x 10 B 2# 2 x10 ea      LAQ  2x10 w strap assist w strap 2 x 10 nv X10; 1# 2x10 1# 2x10 2# 2 x 10 ea 2# 2 x 10 ea     Heel slides  x15 5"x15 5" x 15  5"x15 5"x15 5"x20       adductor set NV  3"x20 nv 3"x20 3"x20 3"x20      Heel raises   x20 20x x20 x20 x20 x20     Standing hamstring curl   x20  20x  x20 x20 x20      Standing hip abd   2x10 2 x 10  Red 2 x 10 ea Red 2 x 10 ea Red 2 x10 ea Green 2 x10 ea     Standing hip extension     Red 2 x10 ea Red 2 x10 ea Red 2 x 10 ea Green 2 x 10 ea     Side step w band        Green x 6     Ther Activity                                       Gait Training             Step ups        4" x20 ea                  Modalities             Cold pack- L knee decline 10' 10' 10' 10' 10' 10' 10'

## 2020-08-10 ENCOUNTER — OFFICE VISIT (OUTPATIENT)
Dept: PHYSICAL THERAPY | Facility: CLINIC | Age: 75
End: 2020-08-10
Payer: COMMERCIAL

## 2020-08-10 DIAGNOSIS — M17.12 PRIMARY OSTEOARTHRITIS OF LEFT KNEE: Primary | ICD-10-CM

## 2020-08-10 DIAGNOSIS — R29.898 WEAKNESS OF BOTH HIPS: ICD-10-CM

## 2020-08-10 DIAGNOSIS — M22.2X1 PATELLOFEMORAL SYNDROME OF BOTH KNEES: ICD-10-CM

## 2020-08-10 DIAGNOSIS — M22.2X2 PATELLOFEMORAL SYNDROME OF BOTH KNEES: ICD-10-CM

## 2020-08-10 DIAGNOSIS — M62.9 HAMSTRING TIGHTNESS OF BOTH LOWER EXTREMITIES: ICD-10-CM

## 2020-08-10 PROCEDURE — 97112 NEUROMUSCULAR REEDUCATION: CPT | Performed by: PHYSICAL THERAPIST

## 2020-08-10 PROCEDURE — 97110 THERAPEUTIC EXERCISES: CPT | Performed by: PHYSICAL THERAPIST

## 2020-08-10 PROCEDURE — 97140 MANUAL THERAPY 1/> REGIONS: CPT | Performed by: PHYSICAL THERAPIST

## 2020-08-10 NOTE — PROGRESS NOTES
Daily Note     Today's date: 8/10/2020  Patient name: Fox Griffiths  :   MRN: [id number]  Referring provider: Monica Ring DO  Dx:   Encounter Diagnosis     ICD-10-CM    1  Primary osteoarthritis of left knee  M17 12    2  Patellofemoral syndrome of both knees  M22 2X1     M22 2X2    3  Weakness of both hips  R29 898    4  Hamstring tightness of both lower extremities  M62 9                   Subjective: Patient states her knee is almost back to the way it was feeling two weeks ago, when she didn't have much pain  She reports most difficulty with stairs  Objective: See treatment diary below      Assessment: Tolerated treatment well  Able to progress step up height to 6 inches this visit  She achieves 118 degrees of knee flexion  She continues to require verbal cueing for exercise technique for entirety of program and to stay on task  Patient would benefit from continued PT  She has no complaints post session  Plan: Continue per plan of care        Precautions: language barrier, high cholesterol, high blood pressure, h/o MI and cardiac bypass, patient tolerance         Manuals 7/8 7/10 7/13 7/15  7/20 7/28 8/3 8/6 8/10    IASTM L medial knee  8' 8' CF 8'  8' 8' 8' 8' 8'    L Ant tibial glide         5' 5'                              Neuro Re-Ed             Quad set 3"x20 3"x20 3"x20 3" x 20  3"x20 3"x20 3"x20  3"x20    Towel crush 3"x20  3"X20 nv         SAQ   unable  2x10 3"x20 #1  2# x20  NV    biodex LOS       lv 11 x5 Lv 11 x5 Lv 10 x 5    Standing theraband press back        Red x 20 ea Red x 20 ea                              Ther Ex             Gastroc stretch w strap 4x15" 4x15" 4x15" 4 x15" 4x20" 4x20" Wall 3 x 20" ea  Wall 5 x 20" ea    Recumbent bike rocking  (NV) 5 min Full rev 5 min Full 5 min Full 8' Full 8' 10' 10' 10'    Hamstring stretch  NV By PT 5 x 20"           SLR Left   w PT assist 2 x 10 w strap 2 x 10 nv B 1# 2 x 10 B 1# 2 x 10 B 2# 2 x10 ea  B 2# 2 x 10 ea    LAQ  2x10 w strap assist w strap 2 x 10 nv X10; 1# 2x10 1# 2x10 2# 2 x 10 ea 2# 2 x 10 ea 3# 2 x 10 ea    Heel slides  x15 5"x15 5" x 15  5"x15 5"x15 5"x20  5"x20     adductor set NV  3"x20 nv 3"x20 3"x20 3"x20      Heel raises   x20 20x x20 x20 x20 x20     Standing hamstring curl   x20  20x  x20 x20 x20      Standing hip abd   2x10 2 x 10  Red 2 x 10 ea Red 2 x 10 ea Red 2 x10 ea Green 2 x10 ea Green 2 x10 ea    Standing hip extension     Red 2 x10 ea Red 2 x10 ea Red 2 x 10 ea Green 2 x 10 ea Green 2 x 10 ea    Side step w band        Green x 6 Green x 6    Ther Activity                                       Gait Training             Step ups        4" x20 ea 6"x10 ea                 Modalities             Cold pack- L knee decline 10' 10' 10' 10' 10' 10' 10' decline

## 2020-08-13 ENCOUNTER — APPOINTMENT (OUTPATIENT)
Dept: PHYSICAL THERAPY | Facility: CLINIC | Age: 75
End: 2020-08-13
Payer: COMMERCIAL

## 2020-08-17 ENCOUNTER — OFFICE VISIT (OUTPATIENT)
Dept: PHYSICAL THERAPY | Facility: CLINIC | Age: 75
End: 2020-08-17
Payer: COMMERCIAL

## 2020-08-17 DIAGNOSIS — M17.12 PRIMARY OSTEOARTHRITIS OF LEFT KNEE: Primary | ICD-10-CM

## 2020-08-17 DIAGNOSIS — M62.9 HAMSTRING TIGHTNESS OF BOTH LOWER EXTREMITIES: ICD-10-CM

## 2020-08-17 DIAGNOSIS — M22.2X1 PATELLOFEMORAL SYNDROME OF BOTH KNEES: ICD-10-CM

## 2020-08-17 DIAGNOSIS — M22.2X2 PATELLOFEMORAL SYNDROME OF BOTH KNEES: ICD-10-CM

## 2020-08-17 DIAGNOSIS — R29.898 WEAKNESS OF BOTH HIPS: ICD-10-CM

## 2020-08-17 PROCEDURE — 97140 MANUAL THERAPY 1/> REGIONS: CPT | Performed by: PHYSICAL THERAPIST

## 2020-08-17 PROCEDURE — 97112 NEUROMUSCULAR REEDUCATION: CPT | Performed by: PHYSICAL THERAPIST

## 2020-08-17 PROCEDURE — 97110 THERAPEUTIC EXERCISES: CPT | Performed by: PHYSICAL THERAPIST

## 2020-08-20 ENCOUNTER — OFFICE VISIT (OUTPATIENT)
Dept: PHYSICAL THERAPY | Facility: CLINIC | Age: 75
End: 2020-08-20
Payer: COMMERCIAL

## 2020-08-20 DIAGNOSIS — M62.9 HAMSTRING TIGHTNESS OF BOTH LOWER EXTREMITIES: ICD-10-CM

## 2020-08-20 DIAGNOSIS — R29.898 WEAKNESS OF BOTH HIPS: ICD-10-CM

## 2020-08-20 DIAGNOSIS — M22.2X2 PATELLOFEMORAL SYNDROME OF BOTH KNEES: ICD-10-CM

## 2020-08-20 DIAGNOSIS — M22.2X1 PATELLOFEMORAL SYNDROME OF BOTH KNEES: ICD-10-CM

## 2020-08-20 DIAGNOSIS — M17.12 PRIMARY OSTEOARTHRITIS OF LEFT KNEE: Primary | ICD-10-CM

## 2020-08-20 PROCEDURE — 97140 MANUAL THERAPY 1/> REGIONS: CPT | Performed by: PHYSICAL THERAPIST

## 2020-08-20 PROCEDURE — 97112 NEUROMUSCULAR REEDUCATION: CPT | Performed by: PHYSICAL THERAPIST

## 2020-08-20 PROCEDURE — 97110 THERAPEUTIC EXERCISES: CPT | Performed by: PHYSICAL THERAPIST

## 2020-08-20 NOTE — PROGRESS NOTES
Daily Note     Today's date: 2020  Patient name: Jacqueline Segovia  :   MRN: [id number]  Referring provider: Armen Rodney DO  Dx:   Encounter Diagnosis     ICD-10-CM    1  Primary osteoarthritis of left knee  M17 12    2  Patellofemoral syndrome of both knees  M22 2X1     M22 2X2    3  Weakness of both hips  R29 898    4  Hamstring tightness of both lower extremities  M62 9                   Subjective: Patient states she's not having any pain  She reports she's getting knee injection next week  Objective: See treatment diary below      Assessment: Tolerated treatment well  No knee pain during or post session  She is advised in continuation of exercises once complete with physical therapy  Goal for remainder of sessions is to ensure independence with HEP  Plan: Continue per plan of care  Plan to d/c to HEP after 20        Precautions: language barrier, high cholesterol, high blood pressure, h/o MI and cardiac bypass, patient tolerance         Manuals 8/20   7/15  7/20 7/28 8/3 8/6 8/10 8/17   IASTM L medial knee 8'   CF 8'  8' 8' 8' 8' 8' 5'   L Ant tibial glide  5'       5' 5' 5'                             Neuro Re-Ed             Quad set    3" x 20  3"x20 3"x20 3"x20  3"x20    Towel crush    nv         SAQ     2x10 3"x20 #1  2# x20  NV    biodex LOS Lv 10 x 5      lv 11 x5 Lv 11 x5 Lv 10 x 5 Lv 10 x 5   Standing theraband press back Into SLB red x20 ea       Red x 20 ea Red x 20 ea Into SLB red x 20 ea                             Ther Ex             Gastroc stretch w strap    4 x15" 4x20" 4x20" Wall 3 x 20" ea  Wall 5 x 20" ea    Recumbent bike  10'   Full 5 min Full 8' Full 8' 10' 10' 10' Full 10'   Hamstring stretch              SLR Left  B 3# 2 x 10 ea   nv B 1# 2 x 10 B 1# 2 x 10 B 2# 2 x10 ea  B 2# 2 x 10 ea B 2# 2 x 10 ea   LAQ B 3# 2 x 10 ea   nv X10; 1# 2x10 1# 2x10 2# 2 x 10 ea 2# 2 x 10 ea 3# 2 x 10 ea B 2# 2 x 10 ea   Heel slides    5" x 15  5"x15 5"x15 5"x20  5"x20     adductor set    nv 3"x20 3"x20 3"x20      Heel raises    20x x20 x20 x20 x20     Standing hamstring curl    20x  x20 x20 x20      Standing hip abd Green 2 x10 ea   2 x 10  Red 2 x 10 ea Red 2 x 10 ea Red 2 x10 ea Green 2 x10 ea Green 2 x10 ea Green 2 x10 ea   Standing hip extension Green 2 x10 ea    Red 2 x10 ea Red 2 x10 ea Red 2 x 10 ea Green 2 x 10 ea Green 2 x 10 ea Green 2 x10 ea   Side step w band Green x6       Green x 6 Green x 6 Green x 6   Ther Activity                                       Gait Training             Step ups biodex x20ea       4" x20 ea 6"x10 ea biodex x 10 ea                Modalities             Cold pack- L knee    10' 10' 10' 10' 10' decline decline

## 2020-08-24 ENCOUNTER — OFFICE VISIT (OUTPATIENT)
Dept: PHYSICAL THERAPY | Facility: CLINIC | Age: 75
End: 2020-08-24
Payer: COMMERCIAL

## 2020-08-24 DIAGNOSIS — M22.2X2 PATELLOFEMORAL SYNDROME OF BOTH KNEES: ICD-10-CM

## 2020-08-24 DIAGNOSIS — M62.9 HAMSTRING TIGHTNESS OF BOTH LOWER EXTREMITIES: ICD-10-CM

## 2020-08-24 DIAGNOSIS — M17.12 PRIMARY OSTEOARTHRITIS OF LEFT KNEE: Primary | ICD-10-CM

## 2020-08-24 DIAGNOSIS — R29.898 WEAKNESS OF BOTH HIPS: ICD-10-CM

## 2020-08-24 DIAGNOSIS — M22.2X1 PATELLOFEMORAL SYNDROME OF BOTH KNEES: ICD-10-CM

## 2020-08-24 PROCEDURE — 97110 THERAPEUTIC EXERCISES: CPT | Performed by: PHYSICAL THERAPIST

## 2020-08-24 PROCEDURE — 97140 MANUAL THERAPY 1/> REGIONS: CPT | Performed by: PHYSICAL THERAPIST

## 2020-08-24 PROCEDURE — 97112 NEUROMUSCULAR REEDUCATION: CPT | Performed by: PHYSICAL THERAPIST

## 2020-08-24 NOTE — PROGRESS NOTES
Daily Note     Today's date: 2020  Patient name: Darlene Renner  :   MRN: [id number]  Referring provider: Jordon Summers DO  Dx:   Encounter Diagnosis     ICD-10-CM    1  Primary osteoarthritis of left knee  M17 12    2  Patellofemoral syndrome of both knees  M22 2X1     M22 2X2    3  Weakness of both hips  R29 898    4  Hamstring tightness of both lower extremities  M62 9                   Subjective: Patient has no new complaints at start of session  States her knee is feeling well  Objective: See treatment diary below      Assessment: Tolerated treatment well  No knee pain during or post session  Patient continues to need verbal cueing for exercise technique, frequency, and to stay on task  Despite continued need for verbal cueing, she does report she is doing her HEP  Patient will be getting an injection this week and has one final session scheduled for next week  Plan: Continue per plan of care  Plan to d/c to HEP after 20        Precautions: language barrier, high cholesterol, high blood pressure, h/o MI and cardiac bypass, patient tolerance         Manuals 8/20 8/24  7/15  7/20 7/28 8/3 8/6 8/10 8/17   IASTM L medial knee 8' 8'  CF 8'  8' 8' 8' 8' 8' 5'   L Ant tibial glide  5' 5'      5' 5' 5'                             Neuro Re-Ed             Quad set    3" x 20  3"x20 3"x20 3"x20  3"x20    Towel crush    nv         SAQ     2x10 3"x20 #1  2# x20  NV    biodex LOS Lv 10 x 5 Lv 9 x5     lv 11 x5 Lv 11 x5 Lv 10 x 5 Lv 10 x 5   Standing theraband press back Into SLB red x20 ea Into SLB red x20 ea      Red x 20 ea Red x 20 ea Into SLB red x 20 ea                             Ther Ex             Gastroc stretch w strap    4 x15" 4x20" 4x20" Wall 3 x 20" ea  Wall 5 x 20" ea    Recumbent bike  10' 10'  Full 5 min Full 8' Full 8' 10' 10' 10' Full 10'   Hamstring stretch              SLR Left  B 3# 2 x 10 ea B 3# 3 x 10 ea  nv B 1# 2 x 10 B 1# 2 x 10 B 2# 2 x10 ea  B 2# 2 x 10 ea B 2# 2 x 10 ea   LAQ B 3# 2 x 10 ea B 3# 3 x 10 ea  nv X10; 1# 2x10 1# 2x10 2# 2 x 10 ea 2# 2 x 10 ea 3# 2 x 10 ea B 2# 2 x 10 ea   Heel slides    5" x 15  5"x15 5"x15 5"x20  5"x20     adductor set    nv 3"x20 3"x20 3"x20      Heel raises    20x x20 x20 x20 x20     Standing hamstring curl  3# 2x10 ea  20x  x20 x20 x20      Standing hip abd Green 2 x10 ea Green 3 x10 ea  2 x 10  Red 2 x 10 ea Red 2 x 10 ea Red 2 x10 ea Green 2 x10 ea Green 2 x10 ea Green 2 x10 ea   Standing hip extension Green 2 x10 ea Green 3 x10 ea   Red 2 x10 ea Red 2 x10 ea Red 2 x 10 ea Green 2 x 10 ea Green 2 x 10 ea Green 2 x10 ea   Side step w band Green x6 Green x 6      Green x 6 Green x 6 Green x 6   Ther Activity                                       Gait Training             Step ups biodex x20ea biodex x 20 ea      4" x20 ea 6"x10 ea biodex x 10 ea                Modalities             Cold pack- L knee    10' 10' 10' 10' 10' decline decline

## 2020-08-26 ENCOUNTER — OFFICE VISIT (OUTPATIENT)
Dept: OBGYN CLINIC | Facility: CLINIC | Age: 75
End: 2020-08-26
Payer: COMMERCIAL

## 2020-08-26 VITALS
HEART RATE: 56 BPM | DIASTOLIC BLOOD PRESSURE: 84 MMHG | TEMPERATURE: 99 F | WEIGHT: 180 LBS | HEIGHT: 61 IN | BODY MASS INDEX: 33.99 KG/M2 | SYSTOLIC BLOOD PRESSURE: 134 MMHG

## 2020-08-26 DIAGNOSIS — M17.12 PRIMARY OSTEOARTHRITIS OF LEFT KNEE: Primary | ICD-10-CM

## 2020-08-26 PROCEDURE — 20610 DRAIN/INJ JOINT/BURSA W/O US: CPT | Performed by: FAMILY MEDICINE

## 2020-08-26 PROCEDURE — 99213 OFFICE O/P EST LOW 20 MIN: CPT | Performed by: FAMILY MEDICINE

## 2020-08-26 RX ORDER — LIDOCAINE HYDROCHLORIDE 10 MG/ML
2 INJECTION, SOLUTION INFILTRATION; PERINEURAL
Status: COMPLETED | OUTPATIENT
Start: 2020-08-26 | End: 2020-08-26

## 2020-08-26 RX ADMIN — LIDOCAINE HYDROCHLORIDE 2 ML: 10 INJECTION, SOLUTION INFILTRATION; PERINEURAL at 14:07

## 2020-08-26 NOTE — PROGRESS NOTES
Assessment/Plan:  Assessment/Plan   Diagnoses and all orders for this visit:    Primary osteoarthritis of left knee  -     Large joint arthrocentesis: L knee      56-year-old female with osteoarthritis of left knee with left knee pain and swelling more than 7 months duration  Discussed with patient physical exam, impression and plan  Physical noted for mild swelling of the knee  She has mild tenderness of the medial joint line  She has full extension knee and flexion to 110°  Patient agreed to proceed with viscosupplementation  I administered Synvisc-One injection to left knee without complication  She was advised to continue physical therapy until formally discharged to continue home exercise program   She was advised if Visco supplement injection needs to be repeated we must wait at least 6 months, however in the interim she may receive corticosteroid injections that are at least 3 months apart  She will follow up as needed  Subjective:   Patient ID: Amanda Ledezma is a 77 y o  female  Chief Complaint   Patient presents with    Left Knee - Follow-up, Pain       56-year-old female with osteoarthritis of left knee following up for left knee pain and swelling of more than 7 months duration  She was last seen by me 2 months ago at which point she underwent aspiration and corticosteroid injection, was prescribed Voltaren gel, and elected to proceed with viscosupplementation  She has been doing physical therapy and home exercises and reports improvement in her symptoms since her last visit  Pain is no longer as intense and she is going up and down stairs more easily  She also has been able to walk with left antalgia and for more prolonged durations  She reports having pain described as generalized knee but worse at the anterior medial aspect, achy and throbbing, worse with physical activity and with stairs, and improved with rest   She has been using Voltaren gel to help with pain      Knee Pain   This is a chronic problem  The current episode started more than 1 month ago  The problem occurs intermittently  The problem has been gradually improving  Associated symptoms include arthralgias  Pertinent negatives include no joint swelling, numbness or weakness  The symptoms are aggravated by standing and walking  She has tried rest (Corticosteroid injection, physical therapy, home exercise) for the symptoms  The treatment provided moderate relief  Review of Systems   Musculoskeletal: Positive for arthralgias  Negative for joint swelling  Neurological: Negative for weakness and numbness  Objective:  Vitals:    08/26/20 1354   BP: 134/84   Pulse: 56   Temp: 99 °F (37 2 °C)   Weight: 81 6 kg (180 lb)   Height: 5' 1" (1 549 m)     Left Knee Exam     Muscle Strength   The patient has normal left knee strength  Tenderness   The patient is experiencing tenderness in the medial joint line  Range of Motion   Extension: normal   Flexion: 110     Other   Swelling: mild            Physical Exam  Vitals signs and nursing note reviewed  Constitutional:       General: She is not in acute distress  Appearance: She is well-developed  HENT:      Head: Normocephalic  Eyes:      Conjunctiva/sclera: Conjunctivae normal    Neck:      Trachea: No tracheal deviation  Cardiovascular:      Rate and Rhythm: Normal rate  Pulmonary:      Effort: Pulmonary effort is normal  No respiratory distress  Abdominal:      General: There is no distension  Skin:     General: Skin is warm and dry  Neurological:      Mental Status: She is alert and oriented to person, place, and time     Psychiatric:         Behavior: Behavior normal            Large joint arthrocentesis: L knee  Date/Time: 8/26/2020 2:07 PM  Consent given by: patient  Site marked: site marked  Timeout: Immediately prior to procedure a time out was called to verify the correct patient, procedure, equipment, support staff and site/side marked as required   Supporting Documentation  Indications: pain   Procedure Details  Location: knee - L knee  Preparation: Patient was prepped and draped in the usual sterile fashion  Needle gauge: 21G    Approach: anterolateral  Medications administered: 2 mL lidocaine 1 %; 48 mg hylan 48 MG/6ML    Patient tolerance: patient tolerated the procedure well with no immediate complications  Dressing:  Sterile dressing applied

## 2020-08-27 ENCOUNTER — APPOINTMENT (OUTPATIENT)
Dept: PHYSICAL THERAPY | Facility: CLINIC | Age: 75
End: 2020-08-27
Payer: COMMERCIAL

## 2020-08-31 ENCOUNTER — OFFICE VISIT (OUTPATIENT)
Dept: PHYSICAL THERAPY | Facility: CLINIC | Age: 75
End: 2020-08-31
Payer: COMMERCIAL

## 2020-08-31 DIAGNOSIS — M22.2X1 PATELLOFEMORAL SYNDROME OF BOTH KNEES: ICD-10-CM

## 2020-08-31 DIAGNOSIS — M17.12 PRIMARY OSTEOARTHRITIS OF LEFT KNEE: Primary | ICD-10-CM

## 2020-08-31 DIAGNOSIS — M22.2X2 PATELLOFEMORAL SYNDROME OF BOTH KNEES: ICD-10-CM

## 2020-08-31 DIAGNOSIS — M62.9 HAMSTRING TIGHTNESS OF BOTH LOWER EXTREMITIES: ICD-10-CM

## 2020-08-31 DIAGNOSIS — R29.898 WEAKNESS OF BOTH HIPS: ICD-10-CM

## 2020-08-31 PROCEDURE — 97140 MANUAL THERAPY 1/> REGIONS: CPT | Performed by: PHYSICAL THERAPIST

## 2020-08-31 PROCEDURE — 97110 THERAPEUTIC EXERCISES: CPT | Performed by: PHYSICAL THERAPIST

## 2020-08-31 PROCEDURE — 97112 NEUROMUSCULAR REEDUCATION: CPT | Performed by: PHYSICAL THERAPIST

## 2020-08-31 NOTE — PROGRESS NOTES
PT Discharge    Today's date: 2020  Patient name: Zehra Rendon  : [id number]  MRN: [id number]  Referring provider: Bere Bianchi DO  Dx:   Encounter Diagnosis     ICD-10-CM    1  Primary osteoarthritis of left knee  M17 12    2  Patellofemoral syndrome of both knees  M22 2X1     M22 2X2    3  Weakness of both hips  R29 898    4  Hamstring tightness of both lower extremities  M62 9                   Assessment  Assessment details: Patient is a 68 y/o female with chief complaints of left knee pain following a fall in 2020  Since starting therapy, she presents with improved functional mobility, decreased pain intensity and frequency, improved knee strength and ROM, and improved gait pattern  She has met maximal potential with skilled PT, but will benefit from continued HEP  She has been provided a written HEP and reviewed exercise technique this visit  All questions answered; she is instructed to call clinic with questions or concerns  Impairments: abnormal or restricted ROM, activity intolerance, impaired physical strength, lacks appropriate home exercise program and pain with function  Understanding of Dx/Px/POC: good   Prognosis: good    Goals  STG within 4 weeks:   1  Patient to be independent in HEP  MET  2  Reduce pain by 50% to improve quality of life  MET   3  Patient to improve left knee flexion to 110 degrees  MET  4  Improve left knee extension to 0 degrees  MET  LTG within 8 weeks:   1  Patient to be independent in ADLs/IADLs without difficulty  MET  2  Patient to be able to ambulate community distances with minimal difficulty  MET  3  Patient to be able to perform reciprocal stairs with minimal difficulty   MET    Plan  Plan details: D/C to HEP  Patient would benefit from: skilled physical therapy and PT eval  Planned modality interventions: cryotherapy, hydrotherapy, unattended electrical stimulation, H-Wave and TENS  Planned therapy interventions: therapeutic training, therapeutic exercise, therapeutic activities, stretching, strengthening, postural training, patient education, neuromuscular re-education, manual therapy, joint mobilization, IADL retraining, activity modification, ADL retraining, ADL training, body mechanics training, flexibility, functional ROM exercises, gait training, graded activity, graded exercise, graded motor and home exercise program  Treatment plan discussed with: patient        Subjective Evaluation    History of Present Illness  Mechanism of injury: Patient is a 70 y/o female s/p a fall in 2020 when she was walking her dogs  She states she went to the ER on 2020 and xray was negative for fracture  She was referred to ortho on 20 and had fluid drained from her knee  She was seen again by ortho on 6/10/20 and had fluid drained and a cortisone injection  She states that helped for about two weeks  She states she feels a lot of pressure in her knee and cramping  Prior to this, she denies any AD  Today, she ambulates with SPC  She reports clicking and occasional giving out of the knee  She is referred for evaluation and treatment of left knee pain  20: Since starting therapy, patient reports there is time when she has zero pain  Her instances of pain are less frequent, as well  She is no longer walking with the cane or wrapping her knee with an ace bandage  She no longer has any clicking or popping in the knee  20: Patient is pleased with her progress in therapy  She states she's having less pain and improved mobility overall  She reports getting a viscosupplementation injection last week which "went well"    Pain  Current pain ratin  At best pain ratin  At worst pain rating: 3  Quality: cramping, pressure and sharp  Progression: improved    Social Support  Steps to enter house: yes  Stairs in house: no   Lives in: apartment  Lives with: patient's oldest granddaugther     Employment status: working  Hand dominance: left  Exercise history: walking the "[employer]   Life stress: medium       Diagnostic Tests  Abnormal x-ray: No xray report available, but patient states she was diagnosed with osteoarthritis  Treatments  Previous treatment: injection treatment and medication  Patient Goals  Patient goals for therapy: decreased pain, increased motion, increased strength and independence with ADLs/IADLs  Patient goal: "to be able to walk better"         Objective     Active Range of Motion   Left Knee   Flexion: 115 degrees   Extension: 0 degrees   Extensor la degrees     Right Knee   Extension: -5 degrees     Strength/Myotome Testing     Left Knee   Flexion: 4+  Extension: 4+  Quadriceps contraction: good    Right Knee   Flexion: 4+  Extension: 4+  Quadriceps contraction: fair      Flowsheet Rows      Most Recent Value   PT/OT G-Codes   Current Score  69   Projected Score  62             Precautions: language barrier, high cholesterol, high blood pressure, h/o MI and cardiac bypass, patient tolerance       Increased time spent on patient education  Updated written HEP provided to patient     Manuals           IASTM L medial knee 8' 8' 8'          L Ant tibial glide  5' 5'                                     Neuro Re-Ed             Quad set             Towel crush             SAQ             biodex LOS Lv 10 x 5 Lv 9 x5 lv 9 x 5          Standing theraband press back Into SLB red x20 ea Into SLB red x20 ea Into SLB red x20 ea                                    Ther Ex             Gastroc stretch w strap             Recumbent bike  10' 10' 10'          Hamstring stretch              SLR Left  B 3# 2 x 10 ea B 3# 3 x 10 ea B 3# 3 x 10 ea          LAQ B 3# 2 x 10 ea B 3# 3 x 10 ea B 3# 3 x 10 ea          Heel slides              adductor set             Heel raises   x20          Standing hamstring curl  3# 2x10 ea HEP          Standing hip abd Green 2 x10 ea Green 3 x10 ea Green 2 x10 Standing hip extension Green 2 x10 ea Green 3 x10 ea Green 2 x10          Side step w band Green x6 Green x 6 Green x 6          Ther Activity                                       Gait Training             Step ups biodex x20ea biodex x 20 ea biodex x 20ea                       Modalities             Cold pack- L knee

## 2020-12-09 ENCOUNTER — OFFICE VISIT (OUTPATIENT)
Dept: DERMATOLOGY | Facility: CLINIC | Age: 75
End: 2020-12-09
Payer: COMMERCIAL

## 2020-12-09 VITALS — TEMPERATURE: 97.8 F

## 2020-12-09 DIAGNOSIS — L82.1 SEBORRHEIC KERATOSIS: ICD-10-CM

## 2020-12-09 DIAGNOSIS — D23.9 HYDROCYSTOMA: ICD-10-CM

## 2020-12-09 DIAGNOSIS — Z13.89 SCREENING FOR SKIN CONDITION: ICD-10-CM

## 2020-12-09 DIAGNOSIS — L81.9 CHANGING PIGMENTED SKIN LESION: Primary | ICD-10-CM

## 2020-12-09 PROCEDURE — 11102 TANGNTL BX SKIN SINGLE LES: CPT | Performed by: DERMATOLOGY

## 2020-12-09 PROCEDURE — 99203 OFFICE O/P NEW LOW 30 MIN: CPT | Performed by: DERMATOLOGY

## 2020-12-09 PROCEDURE — 88305 TISSUE EXAM BY PATHOLOGIST: CPT | Performed by: STUDENT IN AN ORGANIZED HEALTH CARE EDUCATION/TRAINING PROGRAM

## 2020-12-09 RX ORDER — ANTACID TABLETS 500 MG/1
2 TABLET, CHEWABLE ORAL
COMMUNITY

## 2020-12-14 ENCOUNTER — TELEPHONE (OUTPATIENT)
Dept: DERMATOLOGY | Facility: CLINIC | Age: 75
End: 2020-12-14

## 2021-03-12 ENCOUNTER — OFFICE VISIT (OUTPATIENT)
Dept: OBGYN CLINIC | Facility: CLINIC | Age: 76
End: 2021-03-12
Payer: COMMERCIAL

## 2021-03-12 VITALS
HEART RATE: 61 BPM | DIASTOLIC BLOOD PRESSURE: 85 MMHG | WEIGHT: 175 LBS | SYSTOLIC BLOOD PRESSURE: 152 MMHG | HEIGHT: 61 IN | BODY MASS INDEX: 33.04 KG/M2

## 2021-03-12 DIAGNOSIS — M22.2X1 PATELLOFEMORAL SYNDROME OF BOTH KNEES: ICD-10-CM

## 2021-03-12 DIAGNOSIS — R29.898 WEAKNESS OF BOTH HIPS: ICD-10-CM

## 2021-03-12 DIAGNOSIS — R19.8 ABDOMINAL WEAKNESS: ICD-10-CM

## 2021-03-12 DIAGNOSIS — M22.2X2 PATELLOFEMORAL SYNDROME OF BOTH KNEES: ICD-10-CM

## 2021-03-12 DIAGNOSIS — M54.16 RADICULOPATHY, LUMBAR REGION: ICD-10-CM

## 2021-03-12 DIAGNOSIS — M17.12 PRIMARY OSTEOARTHRITIS OF LEFT KNEE: Primary | ICD-10-CM

## 2021-03-12 PROCEDURE — 99213 OFFICE O/P EST LOW 20 MIN: CPT | Performed by: FAMILY MEDICINE

## 2021-03-12 PROCEDURE — 20610 DRAIN/INJ JOINT/BURSA W/O US: CPT | Performed by: FAMILY MEDICINE

## 2021-03-12 RX ORDER — LIDOCAINE HYDROCHLORIDE 10 MG/ML
3 INJECTION, SOLUTION INFILTRATION; PERINEURAL
Status: COMPLETED | OUTPATIENT
Start: 2021-03-12 | End: 2021-03-12

## 2021-03-12 RX ORDER — LIDOCAINE HYDROCHLORIDE 10 MG/ML
4 INJECTION, SOLUTION INFILTRATION; PERINEURAL
Status: COMPLETED | OUTPATIENT
Start: 2021-03-12 | End: 2021-03-12

## 2021-03-12 RX ORDER — TRIAMCINOLONE ACETONIDE 40 MG/ML
40 INJECTION, SUSPENSION INTRA-ARTICULAR; INTRAMUSCULAR
Status: COMPLETED | OUTPATIENT
Start: 2021-03-12 | End: 2021-03-12

## 2021-03-12 RX ADMIN — TRIAMCINOLONE ACETONIDE 40 MG: 40 INJECTION, SUSPENSION INTRA-ARTICULAR; INTRAMUSCULAR at 11:51

## 2021-03-12 RX ADMIN — LIDOCAINE HYDROCHLORIDE 4 ML: 10 INJECTION, SOLUTION INFILTRATION; PERINEURAL at 11:51

## 2021-03-12 RX ADMIN — LIDOCAINE HYDROCHLORIDE 3 ML: 10 INJECTION, SOLUTION INFILTRATION; PERINEURAL at 11:51

## 2021-03-12 NOTE — PROGRESS NOTES
Assessment/Plan:  Assessment/Plan   Diagnoses and all orders for this visit:    Primary osteoarthritis of left knee  -     Large joint arthrocentesis: L knee  -     Ambulatory referral to Physical Therapy; Future  -     Injection procedure prior authorization; Future    Radiculopathy, lumbar region  -     Ambulatory referral to Physical Therapy; Future    Abdominal weakness  -     Ambulatory referral to Physical Therapy; Future    Weakness of both hips  -     Ambulatory referral to Physical Therapy; Future    Patellofemoral syndrome of both knees  -     Ambulatory referral to Physical Therapy; Future        28-year-old female with osteoarthritis of left knee with left knee pain of more than 1 year duration  Discussed with patient physical exam, impression and plan  Physical noted for mild tenderness medial lateral joint line  She has full extension of knee and flexion to 110°  Clinical impression that she is symptomatic from osteoarthritis and abnormal patellofemoral mechanics  I discussed regimen of corticosteroid injection and physical therapy  I administered mixture of 4 cc 1% lidocaine and 1 cc Kenalog to left knee without complication  He may apply topical Voltaren gel 3 to 4 times a day as needed  We will request for repeat one-shot Visco supplement injection  She will return for injection once approved  In the interim she is to start physical therapy as soon as possible and do home exercises as directed  Subjective:   Patient ID: Sarah Melo is a 77 y o  female  Chief Complaint   Patient presents with    Left Knee - Follow-up, Pain, Swelling       28-year-old female with osteoarthritis of left knee following up for left knee pain of more than 14 months duration  She was last seen by me 6 5 months ago at which point she received Synvisc-One injection to left knee  She reports that following injection she experience significant improvement in pain    She has been feeling well up until about 1 month ago  For the past month pain and gradually worsening  She has pain described generalized to the knee worse at the anterior medial aspect, aching and sometimes sharp, radiating distally along the anterior aspect of lower leg, associated with clicking, worse with standing and ambulating, and improved rest   She has also been experiencing numbness/tingling sensation in the left lower leg that is worse with bearing weight  Knee Pain  This is a chronic problem  The current episode started more than 1 year ago  The problem occurs daily  The problem has been gradually worsening  Associated symptoms include arthralgias, joint swelling and numbness  Pertinent negatives include no weakness  The symptoms are aggravated by standing and walking  She has tried rest (Voltaren gel) for the symptoms  The treatment provided mild relief  Review of Systems   Musculoskeletal: Positive for arthralgias and joint swelling  Neurological: Positive for numbness  Negative for weakness  Objective:  Vitals:    03/12/21 1121   BP: 152/85   Pulse: 61   Weight: 79 4 kg (175 lb)   Height: 5' 1" (1 549 m)     Left Knee Exam     Muscle Strength   The patient has normal left knee strength  Tenderness   The patient is experiencing tenderness in the lateral joint line and medial joint line  Range of Motion   Extension: normal   Flexion: 110     Other   Swelling: mild              Physical Exam  Vitals signs and nursing note reviewed  Constitutional:       General: She is not in acute distress  Appearance: She is well-developed  HENT:      Head: Normocephalic  Right Ear: External ear normal       Left Ear: External ear normal    Eyes:      Conjunctiva/sclera: Conjunctivae normal    Neck:      Trachea: No tracheal deviation  Cardiovascular:      Rate and Rhythm: Normal rate  Pulmonary:      Effort: Pulmonary effort is normal  No respiratory distress     Abdominal:      General: There is no distension  Musculoskeletal:         General: Swelling and tenderness present  Skin:     General: Skin is warm and dry  Neurological:      Mental Status: She is alert and oriented to person, place, and time  Psychiatric:         Behavior: Behavior normal          Large joint arthrocentesis: L knee  Universal Protocol:  Consent: Verbal consent obtained  Risks and benefits: risks, benefits and alternatives were discussed  Consent given by: patient  Time out: Immediately prior to procedure a "time out" was called to verify the correct patient, procedure, equipment, support staff and site/side marked as required  Timeout called at: 3/12/2021 11:51 AM   Patient understanding: patient states understanding of the procedure being performed  Patient consent: the patient's understanding of the procedure matches consent given  Procedure consent: procedure consent matches procedure scheduled  Relevant documents: relevant documents present and verified  Test results: test results available and properly labeled  Site marked: the operative site was marked  Radiology Images displayed and confirmed   If images not available, report reviewed: imaging studies available  Required items: required blood products, implants, devices, and special equipment available  Patient identity confirmed: verbally with patient    Supporting Documentation  Indications: pain   Procedure Details  Location: knee - L knee  Preparation: Patient was prepped and draped in the usual sterile fashion  Needle size: 22 G  Ultrasound guidance: no  Approach: anterolateral  Medications administered: 3 mL lidocaine 1 %; 4 mL lidocaine 1 %; 40 mg triamcinolone acetonide 40 mg/mL    Patient tolerance: patient tolerated the procedure well with no immediate complications  Dressing:  Sterile dressing applied

## 2021-03-15 ENCOUNTER — OFFICE VISIT (OUTPATIENT)
Dept: OBGYN CLINIC | Facility: CLINIC | Age: 76
End: 2021-03-15
Payer: COMMERCIAL

## 2021-03-15 VITALS
WEIGHT: 177 LBS | DIASTOLIC BLOOD PRESSURE: 90 MMHG | BODY MASS INDEX: 33.42 KG/M2 | SYSTOLIC BLOOD PRESSURE: 172 MMHG | HEART RATE: 60 BPM | HEIGHT: 61 IN

## 2021-03-15 DIAGNOSIS — M17.12 PRIMARY OSTEOARTHRITIS OF LEFT KNEE: Primary | ICD-10-CM

## 2021-03-15 PROCEDURE — 20610 DRAIN/INJ JOINT/BURSA W/O US: CPT | Performed by: FAMILY MEDICINE

## 2021-03-15 RX ORDER — LIDOCAINE HYDROCHLORIDE 10 MG/ML
3 INJECTION, SOLUTION INFILTRATION; PERINEURAL
Status: COMPLETED | OUTPATIENT
Start: 2021-03-15 | End: 2021-03-15

## 2021-03-15 RX ORDER — MAGNESIUM OXIDE 400 MG/1
1 TABLET ORAL 2 TIMES DAILY
COMMUNITY
Start: 2021-03-13 | End: 2022-07-18 | Stop reason: SDUPTHER

## 2021-03-15 RX ORDER — FLUOXETINE HYDROCHLORIDE 20 MG/1
20 CAPSULE ORAL DAILY
COMMUNITY
Start: 2021-03-13

## 2021-03-15 RX ADMIN — LIDOCAINE HYDROCHLORIDE 3 ML: 10 INJECTION, SOLUTION INFILTRATION; PERINEURAL at 11:57

## 2021-03-15 NOTE — PROGRESS NOTES
Assessment/Plan:  Assessment/Plan   Diagnoses and all orders for this visit:    Primary osteoarthritis of left knee  -     Large joint arthrocentesis: L knee    Other orders  -     magnesium oxide (MAG-OX) 400 mg tablet; Take 1 tablet by mouth 2 (two) times a day  -     FLUoxetine (PROzac) 20 mg capsule; Take 20 mg by mouth daily  -     Diclofenac Sodium (VOLTAREN) 1 %; APPLY 2 G TOPICALLY FOUR TIMES A DAY        43-year-old female osteoarthritis of left knee with left knee pain of more than 1 year duration  Clinical impression that she is symptomatic from osteoarthritis of the knee  She agreed to proceed with viscosupplementation  I administered Synvisc-One injection to left knee without complication  She was advised that if viscosupplementation is to be repeated we must wait at least 6 months  She received corticosteroid injections that are at least 3 months apart from each other  Since she recently received corticosteroid injection I recommend she not receive COVID vaccine until after 03/26/2021  She will follow up as needed  She was advised to follow up with primary care provider regarding elevated blood pressure and headaches  Subjective:   Patient ID: Lolis Blue is a 77 y o  female  Chief Complaint   Patient presents with    Left Knee - Pain, Follow-up       43-year-old female osteoarthritis of left knee following up for left knee pain of more than 1 year duration  She was last seen by me 3 days ago at which point she received corticosteroid injection to left knee and we requested for repeat of Visco supplement injection  She presents today for Visco supplement injection  She reports that since receiving injection left knee pain has started to improve    She has been experiencing pain described as generalized to the knee but worse at the anterior medial aspect, achy, radiating distally along the anterior aspect of lower leg, associated with clicking, worse with standing and ambulation, and improved with resting  She was evaluated in hospital yesterday for severe headache  Knee Pain  This is a chronic problem  The current episode started more than 1 year ago  The problem occurs daily  The problem has been waxing and waning  Associated symptoms include arthralgias and joint swelling  Pertinent negatives include no numbness or weakness  The symptoms are aggravated by standing and walking  She has tried rest (Corticosteroid injection) for the symptoms  The treatment provided moderate relief  Review of Systems   Musculoskeletal: Positive for arthralgias and joint swelling  Neurological: Negative for weakness and numbness  Objective:  Vitals:    03/15/21 1127   BP: (!) 172/90   Pulse: 60   Weight: 80 3 kg (177 lb)   Height: 5' 1" (1 549 m)     Left Knee Exam     Muscle Strength   The patient has normal left knee strength  Tenderness   The patient is experiencing tenderness in the lateral joint line  Range of Motion   Extension: normal             Physical Exam  Vitals signs and nursing note reviewed  Constitutional:       General: She is not in acute distress  Appearance: She is well-developed  HENT:      Head: Normocephalic  Right Ear: External ear normal       Left Ear: External ear normal    Eyes:      Conjunctiva/sclera: Conjunctivae normal    Neck:      Trachea: No tracheal deviation  Cardiovascular:      Rate and Rhythm: Normal rate  Pulmonary:      Effort: Pulmonary effort is normal  No respiratory distress  Abdominal:      General: There is no distension  Musculoskeletal:         General: Tenderness present  Skin:     General: Skin is warm and dry  Neurological:      Mental Status: She is alert and oriented to person, place, and time  Psychiatric:         Behavior: Behavior normal                Large joint arthrocentesis: L knee  Universal Protocol:  Consent: Verbal consent obtained    Risks and benefits: risks, benefits and alternatives were discussed  Consent given by: patient  Time out: Immediately prior to procedure a "time out" was called to verify the correct patient, procedure, equipment, support staff and site/side marked as required  Timeout called at: 3/15/2021 11:56 AM   Patient understanding: patient states understanding of the procedure being performed  Patient consent: the patient's understanding of the procedure matches consent given  Procedure consent: procedure consent matches procedure scheduled  Relevant documents: relevant documents present and verified  Test results: test results available and properly labeled  Site marked: the operative site was marked  Radiology Images displayed and confirmed  If images not available, report reviewed: imaging studies available  Required items: required blood products, implants, devices, and special equipment available  Patient identity confirmed: verbally with patient    Supporting Documentation  Indications: pain   Procedure Details  Location: knee - L knee  Preparation: Patient was prepped and draped in the usual sterile fashion  Needle gauge: 21G    Ultrasound guidance: no  Approach: anterolateral  Medications administered: 3 mL lidocaine 1 %; 48 mg hylan 48 MG/6ML    Patient tolerance: patient tolerated the procedure well with no immediate complications  Dressing:  Sterile dressing applied

## 2021-07-19 ENCOUNTER — OFFICE VISIT (OUTPATIENT)
Dept: OBGYN CLINIC | Facility: CLINIC | Age: 76
End: 2021-07-19
Payer: COMMERCIAL

## 2021-07-19 VITALS
HEIGHT: 61 IN | BODY MASS INDEX: 33.99 KG/M2 | SYSTOLIC BLOOD PRESSURE: 152 MMHG | HEART RATE: 49 BPM | WEIGHT: 180 LBS | DIASTOLIC BLOOD PRESSURE: 84 MMHG

## 2021-07-19 DIAGNOSIS — M17.12 PRIMARY OSTEOARTHRITIS OF LEFT KNEE: Primary | ICD-10-CM

## 2021-07-19 PROCEDURE — 20610 DRAIN/INJ JOINT/BURSA W/O US: CPT | Performed by: FAMILY MEDICINE

## 2021-07-19 PROCEDURE — 99214 OFFICE O/P EST MOD 30 MIN: CPT | Performed by: FAMILY MEDICINE

## 2021-07-19 RX ORDER — LIDOCAINE HYDROCHLORIDE 10 MG/ML
3 INJECTION, SOLUTION INFILTRATION; PERINEURAL
Status: COMPLETED | OUTPATIENT
Start: 2021-07-19 | End: 2021-07-19

## 2021-07-19 RX ORDER — BUPIVACAINE HYDROCHLORIDE 2.5 MG/ML
2 INJECTION, SOLUTION INFILTRATION; PERINEURAL
Status: COMPLETED | OUTPATIENT
Start: 2021-07-19 | End: 2021-07-19

## 2021-07-19 RX ORDER — LIDOCAINE HYDROCHLORIDE 10 MG/ML
2 INJECTION, SOLUTION INFILTRATION; PERINEURAL
Status: COMPLETED | OUTPATIENT
Start: 2021-07-19 | End: 2021-07-19

## 2021-07-19 RX ORDER — TRIAMCINOLONE ACETONIDE 40 MG/ML
40 INJECTION, SUSPENSION INTRA-ARTICULAR; INTRAMUSCULAR
Status: COMPLETED | OUTPATIENT
Start: 2021-07-19 | End: 2021-07-19

## 2021-07-19 RX ADMIN — LIDOCAINE HYDROCHLORIDE 3 ML: 10 INJECTION, SOLUTION INFILTRATION; PERINEURAL at 10:42

## 2021-07-19 RX ADMIN — TRIAMCINOLONE ACETONIDE 40 MG: 40 INJECTION, SUSPENSION INTRA-ARTICULAR; INTRAMUSCULAR at 10:42

## 2021-07-19 RX ADMIN — LIDOCAINE HYDROCHLORIDE 2 ML: 10 INJECTION, SOLUTION INFILTRATION; PERINEURAL at 10:42

## 2021-07-19 RX ADMIN — BUPIVACAINE HYDROCHLORIDE 2 ML: 2.5 INJECTION, SOLUTION INFILTRATION; PERINEURAL at 10:42

## 2021-07-19 NOTE — PROGRESS NOTES
Assessment/Plan:  Assessment/Plan   Diagnoses and all orders for this visit:    Primary osteoarthritis of left knee  -     Large joint arthrocentesis: L knee      78-year-old female with osteoarthritis of left knee with left knee pain of more than 1 year duration  Discussed with patient physical exam, impression and plan  Physical exam of the left knee is noted for medial soft tissue swelling  She has tenderness medial joint line and at the distal medial hamstring  She has full extension and flexion to 120°  There is no appreciable collateral laxity  Clinical impression that she is symptomatic from combination of flare osteoarthritis and strain of the hamstring  Discussed treatment regimen of topical anesthetic, corticosteroid injection, and home exercise  I administered mixture of 2 cc 1% lidocaine, 2 cc 0 25% bupivacaine, and 1 cc Kenalog to the left knee without complication  She may continue weeks of topical diclofenac and she refrain from oral NSAIDs due to her history of cardiac disease  She is to continue with home exercise program as instructed her by physical therapy  She was advised to wait at least 2 weeks before receiving COVID-19 vaccination  She will follow up as needed  Subjective:   Patient ID: Millie Rios is a 77 y o  female  Chief Complaint   Patient presents with    Left Knee - Follow-up, Pain       78-year-old female with osteoarthritis of left knee following up for left knee pain of more than 1 year duration  She was last seen by me 4 months ago which point she was given Synvisc-One injection to left knee  She reports that since that time she was feeling well and did not have any pain up until about 4 days ago  She had pain described as sudden in onset localized to the medial and posterior aspect knee, sharp, nonradiating, worse with bearing weight, associated swelling, and improved rest   She has been taking Tylenol and applying topical diclofenac gel    She states that about 2 days ago pain started to improve  Knee Pain  This is a chronic problem  The current episode started more than 1 year ago  The problem occurs daily  The problem has been waxing and waning  Associated symptoms include arthralgias and joint swelling  Pertinent negatives include no numbness or weakness  The symptoms are aggravated by standing and walking  She has tried rest, acetaminophen and ice (Topical diclofenac) for the symptoms  The treatment provided mild relief  Review of Systems   Musculoskeletal: Positive for arthralgias and joint swelling  Neurological: Negative for weakness and numbness  Objective:  Vitals:    07/19/21 0958   BP: 152/84   Pulse: (!) 49   Weight: 81 6 kg (180 lb)   Height: 5' 1" (1 549 m)     Left Knee Exam     Muscle Strength   The patient has normal left knee strength  Tenderness   The patient is experiencing tenderness in the medial hamstring  Range of Motion   Extension: normal   Flexion: 120     Tests   Varus: negative Valgus: positive (Pain, no laxity)    Other   Swelling: mild            Physical Exam  Vitals and nursing note reviewed  Constitutional:       General: She is not in acute distress  Appearance: She is well-developed  She is not ill-appearing or diaphoretic  HENT:      Head: Normocephalic  Right Ear: External ear normal       Left Ear: External ear normal    Eyes:      Conjunctiva/sclera: Conjunctivae normal    Neck:      Trachea: No tracheal deviation  Cardiovascular:      Comments: Bradycardic  Pulmonary:      Effort: Pulmonary effort is normal  No respiratory distress  Abdominal:      General: There is no distension  Musculoskeletal:         General: Swelling and tenderness present  No deformity or signs of injury  Skin:     General: Skin is warm and dry  Coloration: Skin is not jaundiced or pale  Neurological:      Mental Status: She is alert and oriented to person, place, and time  Psychiatric:         Mood and Affect: Mood normal          Behavior: Behavior normal          Thought Content: Thought content normal          Judgment: Judgment normal            Large joint arthrocentesis: L knee  Universal Protocol:  Consent: Verbal consent obtained  Risks and benefits: risks, benefits and alternatives were discussed  Consent given by: patient  Time out: Immediately prior to procedure a "time out" was called to verify the correct patient, procedure, equipment, support staff and site/side marked as required  Timeout called at: 7/19/2021 10:10 AM   Patient understanding: patient states understanding of the procedure being performed  Patient consent: the patient's understanding of the procedure matches consent given  Procedure consent: procedure consent matches procedure scheduled  Relevant documents: relevant documents present and verified  Test results: test results available and properly labeled  Site marked: the operative site was marked  Radiology Images displayed and confirmed   If images not available, report reviewed: imaging studies available  Required items: required blood products, implants, devices, and special equipment available  Patient identity confirmed: verbally with patient    Supporting Documentation  Indications: pain   Procedure Details  Location: knee - L knee  Preparation: Patient was prepped and draped in the usual sterile fashion  Needle gauge: 21 G 2"  Ultrasound guidance: no  Approach: anterolateral  Medications administered: 2 mL bupivacaine 0 25 %; 2 mL lidocaine 1 %; 3 mL lidocaine 1 %; 40 mg triamcinolone acetonide 40 mg/mL    Patient tolerance: patient tolerated the procedure well with no immediate complications  Dressing:  Sterile dressing applied

## 2022-07-05 ENCOUNTER — TELEPHONE (OUTPATIENT)
Dept: OBGYN CLINIC | Facility: CLINIC | Age: 77
End: 2022-07-05

## 2022-07-05 NOTE — TELEPHONE ENCOUNTER
Dr Whalen   RE: Gel injections  #: [phone]      Patient called into the office to see if gel injections can be ordered for her left knee again  She has an upcoming appt on 7/12/22 and was hoping she would be having them at that time      Please call patient back

## 2022-07-05 NOTE — TELEPHONE ENCOUNTER
Call to this patient to make her aware that Dr Jacque Og is away and will be back on the day of her appointment  She under stood that the injection requires Auth and will come in to talker to him about the injection

## 2022-07-11 DIAGNOSIS — M17.12 PRIMARY OSTEOARTHRITIS OF LEFT KNEE: Primary | ICD-10-CM

## 2022-07-18 ENCOUNTER — PROCEDURE VISIT (OUTPATIENT)
Dept: OBGYN CLINIC | Facility: CLINIC | Age: 77
End: 2022-07-18
Payer: COMMERCIAL

## 2022-07-18 VITALS
HEIGHT: 61 IN | DIASTOLIC BLOOD PRESSURE: 77 MMHG | HEART RATE: 57 BPM | RESPIRATION RATE: 18 BRPM | BODY MASS INDEX: 33.91 KG/M2 | SYSTOLIC BLOOD PRESSURE: 145 MMHG | WEIGHT: 179.6 LBS

## 2022-07-18 DIAGNOSIS — M17.12 PRIMARY OSTEOARTHRITIS OF LEFT KNEE: Primary | ICD-10-CM

## 2022-07-18 PROCEDURE — 20610 DRAIN/INJ JOINT/BURSA W/O US: CPT | Performed by: FAMILY MEDICINE

## 2022-07-18 RX ORDER — BUPIVACAINE HYDROCHLORIDE 2.5 MG/ML
1 INJECTION, SOLUTION INFILTRATION; PERINEURAL
Status: COMPLETED | OUTPATIENT
Start: 2022-07-18 | End: 2022-07-18

## 2022-07-18 RX ORDER — LIDOCAINE HYDROCHLORIDE 10 MG/ML
3 INJECTION, SOLUTION INFILTRATION; PERINEURAL
Status: COMPLETED | OUTPATIENT
Start: 2022-07-18 | End: 2022-07-18

## 2022-07-18 RX ADMIN — BUPIVACAINE HYDROCHLORIDE 1 ML: 2.5 INJECTION, SOLUTION INFILTRATION; PERINEURAL at 12:04

## 2022-07-18 RX ADMIN — LIDOCAINE HYDROCHLORIDE 3 ML: 10 INJECTION, SOLUTION INFILTRATION; PERINEURAL at 12:04

## 2022-07-18 NOTE — PROGRESS NOTES
Assessment/Plan:  Assessment/Plan   Diagnoses and all orders for this visit:    Primary osteoarthritis of left knee  -     Large joint arthrocentesis: L knee        78-year-old female with osteoarthritis of left knee with left knee pain more than 2 years duration  Clinical impression is that she is symptomatic from degenerative changes  She agreed to proceed with viscosupplementation  I administered Synvisc-One to left knee without complication  She will follow up as needed  Subjective:   Patient ID: Frida Akbar is a 77 y o  female  Chief Complaint   Patient presents with    Left Knee - Swelling, Follow-up, Pain       78-year-old female with osteoarthritis of left knee following up for left knee pain more than 2 years duration  She was last seen by me 1 year ago at which point she underwent left knee corticosteroid injection  She reports feeling very good until about 1 month ago  Since that time pain has been worsening  Pain described as generalized knee but worse at the medial and posterior aspect, non radiating, worse with bearing weight and ambulating, associated with swelling, and improved with resting  Knee Pain  This is a chronic problem  The current episode started more than 1 year ago  The problem occurs daily  The problem has been gradually worsening  Associated symptoms include arthralgias and joint swelling  Pertinent negatives include no numbness or weakness  The symptoms are aggravated by bending, standing, twisting and walking  She has tried rest and position changes for the symptoms  The treatment provided mild relief  Review of Systems   Musculoskeletal: Positive for arthralgias and joint swelling  Neurological: Negative for weakness and numbness         Objective:  Vitals:    07/18/22 1155   BP: 145/77   Pulse: 57   Resp: 18   Weight: 81 5 kg (179 lb 9 6 oz)   Height: 5' 1" (1 549 m)     Left Knee Exam     Tenderness   The patient is experiencing tenderness in the medial joint line  Range of Motion   Extension: normal     Other   Swelling: mild            Physical Exam  Vitals and nursing note reviewed  Constitutional:       General: She is not in acute distress  Appearance: She is well-developed  She is not ill-appearing or diaphoretic  HENT:      Head: Normocephalic  Right Ear: External ear normal       Left Ear: External ear normal    Eyes:      Conjunctiva/sclera: Conjunctivae normal    Neck:      Trachea: No tracheal deviation  Cardiovascular:      Rate and Rhythm: Normal rate  Pulmonary:      Effort: Pulmonary effort is normal  No respiratory distress  Abdominal:      General: There is no distension  Musculoskeletal:         General: Swelling and tenderness present  No deformity or signs of injury  Skin:     General: Skin is warm and dry  Coloration: Skin is not jaundiced or pale  Neurological:      Mental Status: She is alert and oriented to person, place, and time  Psychiatric:         Mood and Affect: Mood normal          Behavior: Behavior normal          Thought Content: Thought content normal          Judgment: Judgment normal              Large joint arthrocentesis: L knee  Universal Protocol:  Consent: Verbal consent obtained  Risks and benefits: risks, benefits and alternatives were discussed  Consent given by: patient  Time out: Immediately prior to procedure a "time out" was called to verify the correct patient, procedure, equipment, support staff and site/side marked as required    Patient understanding: patient states understanding of the procedure being performed  Patient consent: the patient's understanding of the procedure matches consent given  Procedure consent: procedure consent matches procedure scheduled  Relevant documents: relevant documents present and verified  Test results: test results available and properly labeled  Site marked: the operative site was marked  Radiology Images displayed and confirmed   If images not available, report reviewed: imaging studies available  Required items: required blood products, implants, devices, and special equipment available  Patient identity confirmed: verbally with patient    Supporting Documentation  Indications: pain   Procedure Details  Location: knee - L knee  Preparation: Patient was prepped and draped in the usual sterile fashion  Needle gauge: 21G 2"  Ultrasound guidance: no  Approach: anterolateral  Medications administered: 1 mL bupivacaine 0 25 %; 3 mL lidocaine 1 %; 48 mg hylan 48 MG/6ML    Patient tolerance: patient tolerated the procedure well with no immediate complications  Dressing:  Sterile dressing applied

## 2022-08-08 ENCOUNTER — DOCUMENTATION (OUTPATIENT)
Dept: OBGYN CLINIC | Facility: HOSPITAL | Age: 77
End: 2022-08-08

## 2022-08-30 ENCOUNTER — OFFICE VISIT (OUTPATIENT)
Dept: OBGYN CLINIC | Facility: CLINIC | Age: 77
End: 2022-08-30
Payer: COMMERCIAL

## 2022-08-30 VITALS
HEART RATE: 53 BPM | HEIGHT: 61 IN | SYSTOLIC BLOOD PRESSURE: 157 MMHG | DIASTOLIC BLOOD PRESSURE: 89 MMHG | BODY MASS INDEX: 33.79 KG/M2 | WEIGHT: 179 LBS

## 2022-08-30 DIAGNOSIS — M25.361 KNEE INSTABILITY, RIGHT: ICD-10-CM

## 2022-08-30 DIAGNOSIS — M17.11 PRIMARY OSTEOARTHRITIS OF RIGHT KNEE: Primary | ICD-10-CM

## 2022-08-30 DIAGNOSIS — M62.838 MUSCLE SPASM: ICD-10-CM

## 2022-08-30 DIAGNOSIS — S80.01XA CONTUSION OF RIGHT KNEE, INITIAL ENCOUNTER: ICD-10-CM

## 2022-08-30 PROCEDURE — 99213 OFFICE O/P EST LOW 20 MIN: CPT | Performed by: FAMILY MEDICINE

## 2022-08-30 RX ORDER — TIZANIDINE 4 MG/1
4 TABLET ORAL 3 TIMES DAILY PRN
Qty: 90 TABLET | Refills: 0 | Status: SHIPPED | OUTPATIENT
Start: 2022-08-30

## 2022-08-30 NOTE — PROGRESS NOTES
Assessment/Plan:  Assessment/Plan   Diagnoses and all orders for this visit:    Primary osteoarthritis of right knee  -     Diclofenac Sodium (VOLTAREN) 1 %; Apply 2 g topically 4 (four) times a day    Contusion of right knee, initial encounter  -     Diclofenac Sodium (VOLTAREN) 1 %; Apply 2 g topically 4 (four) times a day    Knee instability, right  -     Brace    Muscle spasm  -     tiZANidine (ZANAFLEX) 4 mg tablet; Take 1 tablet (4 mg total) by mouth 3 (three) times a day as needed for muscle spasms        64-year-old female with right knee pain and swelling from fall injury at home on 08/25/2022  Discussed with patient physical exam, radiographs, impression and plan  X-rays of the right knee are unremarkable for acute osseous abnormality  Physical noted for anterior knee soft tissue swelling and ecchymosis  She has moderate tenderness at the tibial tuberosity and medial and lateral tibial plateau  She has full extension and flexion to 120°  There is mild laxity with valgus stress  There is positive patellar inhibition and grind  There is no groin pain with LAMIN and FADDIR maneuvers of the hips  Clinical impression is that she sustained contusion to the knee with aggravated arthritis  I discussed regimen of topical anti-inflammatory, bracing, and icing  I provided with hinged knee brace to help with stability  She is to apply topical diclofenac gel 3 times a day for the next 10 days  She continue with elevation  She is to do icing daily  She may take tizanidine 4 mg up to 3 times a day as needed and may also take leg cramps vitamin  Was advised symptoms may take few weeks to resolve  She will follow up as needed  Subjective:   Patient ID: Luis Rivera is a 69 y o  female  Chief Complaint   Patient presents with    Right Knee - Pain       64-year-old female presents for evaluation of right knee pain and swelling from fall injury at home on 08/25/2022    She tripped and fell forward landing on the anterior aspect of knee  She had pain described as sudden in onset, generalized to the knee but worse at the anterior aspect, radiating distally to lower leg, worse with bearing weight and ambulating, associated with swelling, and improved resting  She was taken the emergency room where x-ray evaluation was unremarkable for acute osseous abnormality  She started having bruising  She has been icing and elevating  She reports associated muscle spasm in the legs  She has also been applying topical lidocaine to help with pain  Knee Pain  This is a new problem  The current episode started in the past 7 days  The problem occurs daily  The problem has been gradually improving  Associated symptoms include arthralgias and joint swelling  Pertinent negatives include no numbness or weakness  The symptoms are aggravated by standing and walking  She has tried rest, ice and position changes for the symptoms  The treatment provided mild relief  Review of Systems   Musculoskeletal: Positive for arthralgias and joint swelling  Neurological: Negative for weakness and numbness  Objective:  Vitals:    08/30/22 1453   BP: 157/89   Pulse: (!) 53   Weight: 81 2 kg (179 lb)   Height: 5' 1" (1 549 m)     Right Knee Exam     Muscle Strength   The patient has normal right knee strength  Tenderness   Right knee tenderness location: Tibial tuberosity, medial and lateral tibial plateau  Range of Motion   Extension: normal   Flexion: 120     Tests   Varus: negative Valgus: positive    Other   Swelling: mild    Comments:  Positive patellar inhibition and grind            Physical Exam  Vitals and nursing note reviewed  Constitutional:       General: She is not in acute distress  Appearance: She is well-developed  She is not ill-appearing or diaphoretic  HENT:      Head: Normocephalic        Right Ear: External ear normal       Left Ear: External ear normal    Eyes: Conjunctiva/sclera: Conjunctivae normal    Neck:      Trachea: No tracheal deviation  Cardiovascular:      Rate and Rhythm: Normal rate  Pulmonary:      Effort: Pulmonary effort is normal  No respiratory distress  Abdominal:      General: There is no distension  Musculoskeletal:         General: Swelling, tenderness and signs of injury present  No deformity  Skin:     General: Skin is warm and dry  Coloration: Skin is not jaundiced or pale  Neurological:      Mental Status: She is alert and oriented to person, place, and time  Psychiatric:         Mood and Affect: Mood normal          Behavior: Behavior normal          Thought Content:  Thought content normal          Judgment: Judgment normal

## 2022-12-08 ENCOUNTER — OFFICE VISIT (OUTPATIENT)
Dept: OBGYN CLINIC | Facility: CLINIC | Age: 77
End: 2022-12-08

## 2022-12-08 VITALS
BODY MASS INDEX: 33.61 KG/M2 | DIASTOLIC BLOOD PRESSURE: 91 MMHG | SYSTOLIC BLOOD PRESSURE: 165 MMHG | WEIGHT: 178 LBS | HEART RATE: 56 BPM | HEIGHT: 61 IN

## 2022-12-08 DIAGNOSIS — M17.0 PRIMARY OSTEOARTHRITIS OF BOTH KNEES: Primary | ICD-10-CM

## 2022-12-08 RX ORDER — BUPIVACAINE HYDROCHLORIDE 2.5 MG/ML
2 INJECTION, SOLUTION INFILTRATION; PERINEURAL
Status: COMPLETED | OUTPATIENT
Start: 2022-12-08 | End: 2022-12-08

## 2022-12-08 RX ORDER — TRIAMCINOLONE ACETONIDE 40 MG/ML
40 INJECTION, SUSPENSION INTRA-ARTICULAR; INTRAMUSCULAR
Status: COMPLETED | OUTPATIENT
Start: 2022-12-08 | End: 2022-12-08

## 2022-12-08 RX ADMIN — TRIAMCINOLONE ACETONIDE 40 MG: 40 INJECTION, SUSPENSION INTRA-ARTICULAR; INTRAMUSCULAR at 10:44

## 2022-12-08 RX ADMIN — BUPIVACAINE HYDROCHLORIDE 2 ML: 2.5 INJECTION, SOLUTION INFILTRATION; PERINEURAL at 10:06

## 2022-12-08 NOTE — PROGRESS NOTES
Assessment/Plan:  Assessment/Plan   Diagnoses and all orders for this visit:    Primary osteoarthritis of both knees  -     Large joint arthrocentesis: bilateral knee        78-year-old female with osteoarthritis of both knees with bilateral knee pain few years duration  Clinical impression is that she is symptomatic from degenerative changes  I offered patient repeat steroid injections to which she agreed  I administered mixtures of 2 cc 0 25% bupivacaine, 2 cc 1% mepivacaine, and 1 cc Kenalog to each knee without complication  She will follow up as needed  Subjective:   Patient ID: Luis Rivera is a 69 y o  female  Chief Complaint   Patient presents with   • Left Knee - Follow-up, Pain   • Right Knee - Follow-up       78-year-old female with osteoarthritis of both knees following up for bilateral knee pain few years duration  She was last seen by me 3 months ago at which point she was advised on applying topical diclofenac gel and she was provided with right knee brace  She underwent Visco injection left knee 07/18/2022  She reports improvement pain that lasted for few months  She reports worsening pain both knees  She has pain described as localized to the medial aspect knees, nonradiating, worse with bearing weight and ambulating, worse with twisting, associated clicking/crepitus, and improved with resting  Knee Pain  This is a chronic problem  The current episode started more than 1 year ago  The problem occurs daily  The problem has been gradually worsening  Associated symptoms include arthralgias and joint swelling  Pertinent negatives include no numbness or weakness  The symptoms are aggravated by standing, walking and twisting  She has tried rest and position changes for the symptoms  The treatment provided mild relief  Review of Systems   Musculoskeletal: Positive for arthralgias and joint swelling  Neurological: Negative for weakness and numbness  Objective:  Vitals:    12/08/22 0948   BP: 165/91   Pulse: 56   Weight: 80 7 kg (178 lb)   Height: 5' 1" (1 549 m)     Right Knee Exam     Muscle Strength   The patient has normal right knee strength  Tenderness   The patient is experiencing tenderness in the medial joint line  Range of Motion   Extension: normal     Other   Swelling: mild      Left Knee Exam     Muscle Strength   The patient has normal left knee strength  Tenderness   The patient is experiencing tenderness in the medial joint line  Range of Motion   Extension: normal     Other   Swelling: mild            Physical Exam  Vitals and nursing note reviewed  Constitutional:       General: She is not in acute distress  Appearance: She is well-developed  She is not ill-appearing or diaphoretic  HENT:      Head: Normocephalic  Right Ear: External ear normal       Left Ear: External ear normal    Eyes:      Conjunctiva/sclera: Conjunctivae normal    Neck:      Trachea: No tracheal deviation  Cardiovascular:      Rate and Rhythm: Normal rate  Pulmonary:      Effort: Pulmonary effort is normal  No respiratory distress  Abdominal:      General: There is no distension  Musculoskeletal:         General: Tenderness present  Skin:     General: Skin is warm and dry  Coloration: Skin is not jaundiced or pale  Neurological:      Mental Status: She is alert and oriented to person, place, and time  Psychiatric:         Mood and Affect: Mood normal          Behavior: Behavior normal          Thought Content: Thought content normal          Judgment: Judgment normal                  Large joint arthrocentesis: bilateral knee  Universal Protocol:  Consent: Verbal consent obtained    Risks and benefits: risks, benefits and alternatives were discussed  Consent given by: patient  Time out: Immediately prior to procedure a "time out" was called to verify the correct patient, procedure, equipment, support staff and site/side marked as required  Patient understanding: patient states understanding of the procedure being performed  Patient consent: the patient's understanding of the procedure matches consent given  Procedure consent: procedure consent matches procedure scheduled  Relevant documents: relevant documents present and verified  Test results: test results available and properly labeled  Site marked: the operative site was marked  Radiology Images displayed and confirmed   If images not available, report reviewed: imaging studies available  Required items: required blood products, implants, devices, and special equipment available  Patient identity confirmed: verbally with patient    Supporting Documentation  Indications: pain   Procedure Details  Location: knee - bilateral knee  Preparation: Patient was prepped and draped in the usual sterile fashion  Needle gauge: 21G 2"  Ultrasound guidance: no  Approach: anteromedial    Medications (Right): 2 mL bupivacaine 0 25 %; 40 mg triamcinolone acetonide 40 mg/mL(5 mL mepivacaine 1%, NDC 73524-743-75, LOT [id number], EXP 05/2025)  Medications (Left): 2 mL bupivacaine 0 25 %; 40 mg triamcinolone acetonide 40 mg/mL (5 mL mepivacaine 1%, NDC 24798-845-50, LOT [id number], EXP 05/2025)    Patient tolerance: patient tolerated the procedure well with no immediate complications  Dressing:  Sterile dressing applied

## 2023-05-23 ENCOUNTER — PROCEDURE VISIT (OUTPATIENT)
Dept: OBGYN CLINIC | Facility: CLINIC | Age: 78
End: 2023-05-23

## 2023-05-23 VITALS
SYSTOLIC BLOOD PRESSURE: 178 MMHG | HEART RATE: 52 BPM | BODY MASS INDEX: 34.36 KG/M2 | HEIGHT: 61 IN | WEIGHT: 182 LBS | DIASTOLIC BLOOD PRESSURE: 90 MMHG

## 2023-05-23 DIAGNOSIS — M17.0 PRIMARY OSTEOARTHRITIS OF BOTH KNEES: Primary | ICD-10-CM

## 2023-05-23 RX ORDER — BUPIVACAINE HYDROCHLORIDE 2.5 MG/ML
2 INJECTION, SOLUTION INFILTRATION; PERINEURAL
Status: COMPLETED | OUTPATIENT
Start: 2023-05-23 | End: 2023-05-23

## 2023-05-23 RX ORDER — BUPIVACAINE HYDROCHLORIDE 2.5 MG/ML
1 INJECTION, SOLUTION INFILTRATION; PERINEURAL
Status: COMPLETED | OUTPATIENT
Start: 2023-05-23 | End: 2023-05-23

## 2023-05-23 RX ADMIN — BUPIVACAINE HYDROCHLORIDE 1 ML: 2.5 INJECTION, SOLUTION INFILTRATION; PERINEURAL at 14:09

## 2023-05-23 RX ADMIN — BUPIVACAINE HYDROCHLORIDE 2 ML: 2.5 INJECTION, SOLUTION INFILTRATION; PERINEURAL at 14:09

## 2023-05-23 NOTE — PROGRESS NOTES
Assessment/Plan:  Assessment/Plan   Diagnoses and all orders for this visit:    Primary osteoarthritis of both knees  -     Large joint arthrocentesis: bilateral knee        41-year-old female with osteoarthritis of both knees with pain both knees more than few years duration  Clinical impression is she has symptoms from degenerative changes  She agreed to proceed with Visco supplementation  I administered Synvisc 1 to each knee without complication  She will follow-up as needed  Subjective:   Patient ID: Oliva Velazquez is a 69 y o  female  Chief Complaint   Patient presents with   • Left Knee - Follow-up, Pain   • Right Knee - Follow-up, Pain       41-year-old female with osteoarthritis of both knees following up for pain both knees more than few years duration  She was last seen by me 5 5 months ago at which point she was given steroid injection to both knees  She completed visco injection to left knee July 2022  She presents today for Visco injection both knees  She has been experiencing worsening pain both knees  She has pain described as generalized to the knees, nonradiating, worse with bearing weight and ambulating, rated as 9-10 out of 10 at worst, and improved with resting  She been taking Tylenol, applying topical diclofenac to help with symptoms  Knee Pain  This is a chronic problem  The current episode started more than 1 year ago  The problem occurs daily  The problem has been gradually worsening  Associated symptoms include arthralgias and joint swelling  Pertinent negatives include no numbness or weakness  The symptoms are aggravated by standing and walking  She has tried rest and acetaminophen for the symptoms  The treatment provided mild relief  Review of Systems   Musculoskeletal: Positive for arthralgias and joint swelling  Neurological: Negative for weakness and numbness         Objective:  Vitals:    05/23/23 1315   BP: (!) 178/90   Pulse: (!) 52 "  Weight: 82 6 kg (182 lb)   Height: 5' 1\" (1 549 m)     Ortho Exam    Physical Exam  Vitals and nursing note reviewed  Constitutional:       General: She is not in acute distress  Appearance: She is well-developed  She is not ill-appearing or diaphoretic  HENT:      Head: Normocephalic and atraumatic  Right Ear: External ear normal       Left Ear: External ear normal    Eyes:      Conjunctiva/sclera: Conjunctivae normal    Neck:      Trachea: No tracheal deviation  Cardiovascular:      Comments: Bradycardic  Pulmonary:      Effort: Pulmonary effort is normal  No respiratory distress  Abdominal:      General: There is no distension  Skin:     General: Skin is warm and dry  Coloration: Skin is not jaundiced or pale  Neurological:      Mental Status: She is alert and oriented to person, place, and time  Psychiatric:         Mood and Affect: Mood normal          Behavior: Behavior normal          Thought Content: Thought content normal          Judgment: Judgment normal              Large joint arthrocentesis: bilateral knee  Universal Protocol:  Consent: Verbal consent obtained  Risks and benefits: risks, benefits and alternatives were discussed  Consent given by: patient  Time out: Immediately prior to procedure a \"time out\" was called to verify the correct patient, procedure, equipment, support staff and site/side marked as required  Patient understanding: patient states understanding of the procedure being performed  Patient consent: the patient's understanding of the procedure matches consent given  Procedure consent: procedure consent matches procedure scheduled  Relevant documents: relevant documents present and verified  Test results: test results available and properly labeled  Site marked: the operative site was marked  Radiology Images displayed and confirmed   If images not available, report reviewed: imaging studies available  Required items: required blood products, implants, " "devices, and special equipment available  Patient identity confirmed: verbally with patient    Supporting Documentation  Indications: pain   Procedure Details  Location: knee - bilateral knee  Preparation: Patient was prepped and draped in the usual sterile fashion  Needle gauge: 21G 2\"  Ultrasound guidance: no  Approach: anteromedial    Medications (Right): 48 mg hylan 48 MG/6ML; 2 mL bupivacaine 0 25 %; 1 mL bupivacaine 0 25 %Medications (Left): 48 mg hylan 48 MG/6ML; 1 mL bupivacaine 0 25 %; 2 mL bupivacaine 0 25 %   Patient tolerance: patient tolerated the procedure well with no immediate complications  Dressing:  Sterile dressing applied            "

## 2023-11-13 ENCOUNTER — TELEPHONE (OUTPATIENT)
Age: 78
End: 2023-11-13

## 2023-11-13 DIAGNOSIS — M17.0 PRIMARY OSTEOARTHRITIS OF BOTH KNEES: Primary | ICD-10-CM

## 2023-11-13 NOTE — TELEPHONE ENCOUNTER
Caller: Patient  Doctor/office: Tanesha paul   #: [phone]      Patient called to start auth/delivery for VISCO/EUFLEXXA/Durolane injections    Body Part: Both knees   Pain Level (scale 1-10): 9  Date of last Gel Injection: 4/27/23    Educated patient on Visco procedure.  Medications must first be authorized and delivered to our office BEFORE we can schedule

## 2023-11-15 NOTE — TELEPHONE ENCOUNTER
Caller: Patient     Doctor: Leyda Watts     Reason for call: Asked if we got auth for her injection     Call back#: [phone]

## 2023-12-01 ENCOUNTER — TELEPHONE (OUTPATIENT)
Dept: OBGYN CLINIC | Facility: HOSPITAL | Age: 78
End: 2023-12-01

## 2023-12-01 NOTE — TELEPHONE ENCOUNTER
Caller: Patient    Doctor: Juan Osborn    Reason for call: Patient had Covid on 11/18, wants to know if it's ok to proceed with knee injection on 12/04. Please advise.     Call back#: [phone]

## 2023-12-04 ENCOUNTER — PROCEDURE VISIT (OUTPATIENT)
Dept: OBGYN CLINIC | Facility: CLINIC | Age: 78
End: 2023-12-04
Payer: COMMERCIAL

## 2023-12-04 VITALS
DIASTOLIC BLOOD PRESSURE: 75 MMHG | SYSTOLIC BLOOD PRESSURE: 185 MMHG | HEIGHT: 61 IN | HEART RATE: 82 BPM | WEIGHT: 177.8 LBS | BODY MASS INDEX: 33.57 KG/M2

## 2023-12-04 DIAGNOSIS — M17.0 PRIMARY OSTEOARTHRITIS OF BOTH KNEES: Primary | ICD-10-CM

## 2023-12-04 PROCEDURE — 20610 DRAIN/INJ JOINT/BURSA W/O US: CPT | Performed by: FAMILY MEDICINE

## 2023-12-04 RX ORDER — BUPIVACAINE HYDROCHLORIDE 2.5 MG/ML
1 INJECTION, SOLUTION INFILTRATION; PERINEURAL
Status: COMPLETED | OUTPATIENT
Start: 2023-12-04 | End: 2023-12-04

## 2023-12-04 RX ORDER — BUPIVACAINE HYDROCHLORIDE 2.5 MG/ML
2 INJECTION, SOLUTION INFILTRATION; PERINEURAL
Status: COMPLETED | OUTPATIENT
Start: 2023-12-04 | End: 2023-12-04

## 2023-12-04 RX ADMIN — BUPIVACAINE HYDROCHLORIDE 1 ML: 2.5 INJECTION, SOLUTION INFILTRATION; PERINEURAL at 11:30

## 2023-12-04 RX ADMIN — BUPIVACAINE HYDROCHLORIDE 2 ML: 2.5 INJECTION, SOLUTION INFILTRATION; PERINEURAL at 11:30

## 2023-12-04 NOTE — PROGRESS NOTES
Assessment/Plan:  Assessment/Plan   Diagnoses and all orders for this visit:    Primary osteoarthritis of both knees  -     Large joint arthrocentesis: bilateral knee      73-year-old female with osteoarthritis of both knees with pain both knees many years duration. Clinical impression is that she has symptoms from degenerative changes. She agreed to proceed with Visco injection. I administered Synvisc 1 to each knee without complication. She will follow-up as needed. Subjective:   Patient ID: Jessica Kramer is a 69 y.o. female. Chief Complaint   Patient presents with    Right Knee - Follow-up    Left Knee - Follow-up        73-year-old female with osteoarthritis of both knees following up for pain both knees many years duration. She was last seen by me 6.5 months ago at which point she underwent Synvisc 1 injection to both knees. She reports that following injection she has significant movement and was doing well until 1 to 2 months ago. Since that time pain both knees have been worsening. She has pain described as generalized to the knees, nonradiating, worse with physical activity, rated as 6 out of 10, and improved with resting. Knee Pain  This is a chronic problem. The current episode started more than 1 year ago. The problem occurs daily. The problem has been gradually worsening. Associated symptoms include arthralgias and joint swelling. Pertinent negatives include no numbness or weakness. The symptoms are aggravated by standing and walking. She has tried rest and position changes for the symptoms. The treatment provided mild relief. Review of Systems   Musculoskeletal:  Positive for arthralgias and joint swelling. Neurological:  Negative for weakness and numbness. Objective:  Vitals:    12/04/23 1138   BP: (!) 185/75   Pulse: 82   Weight: 80.6 kg (177 lb 12.8 oz)   Height: 5' 1" (1.549 m)      Ortho Exam    Physical Exam  Vitals and nursing note reviewed. Constitutional:       Appearance: Normal appearance. She is well-developed. She is not ill-appearing or diaphoretic. HENT:      Head: Normocephalic and atraumatic. Right Ear: External ear normal.      Left Ear: External ear normal.   Eyes:      Conjunctiva/sclera: Conjunctivae normal.   Neck:      Trachea: No tracheal deviation. Cardiovascular:      Rate and Rhythm: Normal rate. Pulmonary:      Effort: Pulmonary effort is normal. No respiratory distress. Abdominal:      General: There is no distension. Skin:     General: Skin is warm and dry. Coloration: Skin is not jaundiced or pale. Neurological:      Mental Status: She is alert and oriented to person, place, and time. Psychiatric:         Mood and Affect: Mood normal.         Behavior: Behavior normal.         Thought Content: Thought content normal.         Judgment: Judgment normal.           Large joint arthrocentesis: bilateral knee  Universal Protocol:  Consent: Verbal consent obtained. Risks and benefits: risks, benefits and alternatives were discussed  Consent given by: patient  Time out: Immediately prior to procedure a "time out" was called to verify the correct patient, procedure, equipment, support staff and site/side marked as required. Patient understanding: patient states understanding of the procedure being performed  Patient consent: the patient's understanding of the procedure matches consent given  Procedure consent: procedure consent matches procedure scheduled  Relevant documents: relevant documents present and verified  Test results: test results available and properly labeled  Site marked: the operative site was marked  Radiology Images displayed and confirmed.  If images not available, report reviewed: imaging studies available  Required items: required blood products, implants, devices, and special equipment available  Patient identity confirmed: verbally with patient  Supporting Documentation  Indications: pain Procedure Details  Location: knee - bilateral knee  Preparation: Patient was prepped and draped in the usual sterile fashion  Needle gauge: 21G 2"  Approach: anteromedial    Medications (Right): 2 mL bupivacaine 0.25 %; 1 mL bupivacaine 0.25 %; 48 mg hylan 48 MG/6MLMedications (Left): 1 mL bupivacaine 0.25 %; 2 mL bupivacaine 0.25 %; 48 mg hylan 48 MG/6ML   Patient tolerance: patient tolerated the procedure well with no immediate complications  Dressing:  Sterile dressing applied

## 2024-06-03 ENCOUNTER — TELEPHONE (OUTPATIENT)
Age: 79
End: 2024-06-03

## 2024-06-03 DIAGNOSIS — M17.0 PRIMARY OSTEOARTHRITIS OF BOTH KNEES: Primary | ICD-10-CM

## 2024-06-03 NOTE — TELEPHONE ENCOUNTER
Caller: patient  Doctor/office: greg  #: [phone]      patient called to start auth/delivery for VISCO(Gel) injections.    Body Part: bi lat knee  Pain Level (scale 1-10): 9  Date of last Gel Injection: 12/4/23    Educated patient on Visco procedure. Auth team will review insurance and process the request appropriately. Patient will be contacted if the have any questions and when ready to schedule.

## 2024-06-14 ENCOUNTER — TELEPHONE (OUTPATIENT)
Dept: OBGYN CLINIC | Facility: HOSPITAL | Age: 79
End: 2024-06-14

## 2024-06-14 NOTE — TELEPHONE ENCOUNTER
Caller: Grecia    Doctor: Koby    Reason for call: Patient has been approved for Visco, no openings till July, patient explained she is in severe pain and has been waiting to get these. Asking if there is a ways she can get in sooner than July 9th?    [facility] BILATERAL KNEES SYNVISC-ONE BUY & BILL     Call back#: [phone]

## 2024-06-18 ENCOUNTER — PROCEDURE VISIT (OUTPATIENT)
Dept: OBGYN CLINIC | Facility: CLINIC | Age: 79
End: 2024-06-18
Payer: COMMERCIAL

## 2024-06-18 ENCOUNTER — TELEPHONE (OUTPATIENT)
Dept: OBGYN CLINIC | Facility: CLINIC | Age: 79
End: 2024-06-18

## 2024-06-18 VITALS
DIASTOLIC BLOOD PRESSURE: 93 MMHG | HEIGHT: 61 IN | HEART RATE: 54 BPM | BODY MASS INDEX: 33.15 KG/M2 | SYSTOLIC BLOOD PRESSURE: 188 MMHG | WEIGHT: 175.6 LBS

## 2024-06-18 DIAGNOSIS — M17.0 PRIMARY OSTEOARTHRITIS OF BOTH KNEES: Primary | ICD-10-CM

## 2024-06-18 PROCEDURE — 20610 DRAIN/INJ JOINT/BURSA W/O US: CPT | Performed by: FAMILY MEDICINE

## 2024-06-18 RX ORDER — BUPIVACAINE HYDROCHLORIDE 2.5 MG/ML
1 INJECTION, SOLUTION INFILTRATION; PERINEURAL
Status: COMPLETED | OUTPATIENT
Start: 2024-06-18 | End: 2024-06-18

## 2024-06-18 RX ORDER — BUPIVACAINE HYDROCHLORIDE 2.5 MG/ML
2 INJECTION, SOLUTION INFILTRATION; PERINEURAL
Status: COMPLETED | OUTPATIENT
Start: 2024-06-18 | End: 2024-06-18

## 2024-06-18 RX ORDER — METHOCARBAMOL 500 MG/1
TABLET, FILM COATED ORAL
COMMUNITY
Start: 2024-05-12

## 2024-06-18 RX ORDER — LATANOPROST 50 UG/ML
1 SOLUTION/ DROPS OPHTHALMIC
COMMUNITY
Start: 2024-05-20

## 2024-06-18 RX ORDER — NAPROXEN 500 MG/1
TABLET ORAL
COMMUNITY
Start: 2024-04-22

## 2024-06-18 RX ADMIN — BUPIVACAINE HYDROCHLORIDE 2 ML: 2.5 INJECTION, SOLUTION INFILTRATION; PERINEURAL at 10:30

## 2024-06-18 RX ADMIN — BUPIVACAINE HYDROCHLORIDE 1 ML: 2.5 INJECTION, SOLUTION INFILTRATION; PERINEURAL at 10:30

## 2024-06-18 NOTE — PROGRESS NOTES
Assessment/Plan:  Assessment & Plan   Diagnoses and all orders for this visit:    Primary osteoarthritis of both knees  -     Large joint arthrocentesis: bilateral knee    Other orders  -     latanoprost (XALATAN) 0.005 % ophthalmic solution; Administer 1 drop to both eyes daily at bedtime  -     methocarbamol (ROBAXIN) 500 mg tablet; TAKE 1 TABLET BY MOUTH EVERY DAY AT BEDTIME FOR 7 DAYS  -     naproxen (NAPROSYN) 500 mg tablet; TAKE 1 TABLET (500 MG TOTAL) BY MOUTH EVERY 12 (TWELVE) HOURS AS NEEDED (FOR PAIN). TAKE WITH FOOD.      78-year-old female with osteoarthritis of both knees.  Clinical impression is that she has symptoms from degenerative changes.  She agreed to proceed with Visco injection.  I administered Synvisc 1 to each knee without complication.  She will follow-up as needed.        Subjective:   Patient ID: Grecia Robles is a 78 y.o. female.  Chief Complaint   Patient presents with    Right Knee - Follow-up     Visco Injection    Left Knee - Follow-up     Visco Injection        78-year-old female with osteoarthritis  of both knees following up for pain both knees many years duration.  She was last seen by me 6.5 months ago at which point she received Synvisc 1 to both knees.  She returns today for repeat visco injection.  She reports that following visco injection she has significant groin pain that lasted until about 2 weeks ago.  She has pain described generalized to the knees but worse to the anterior medial aspects, nonradiating, rated as 9 out of 10 at worst, worse with movement and ambulating, associated with swelling, and improved with resting.    Knee Pain  This is a chronic problem. The current episode started more than 1 year ago. The problem occurs daily. The problem has been gradually worsening. Associated symptoms include arthralgias and joint swelling. Pertinent negatives include no numbness or weakness. The symptoms are aggravated by standing and walking. She has tried rest,  "position changes and ice for the symptoms. The treatment provided mild relief.               Review of Systems   Musculoskeletal:  Positive for arthralgias and joint swelling.   Neurological:  Negative for weakness and numbness.       Objective:  Vitals:    06/18/24 1031   BP: (!) 188/93   Pulse: (!) 54   Weight: 79.7 kg (175 lb 9.6 oz)   Height: 5' 1\" (1.549 m)      Right Knee Exam     Tenderness   The patient is experiencing tenderness in the medial joint line.    Other   Swelling: mild      Left Knee Exam     Tenderness   The patient is experiencing tenderness in the medial joint line.    Other   Swelling: mild            Physical Exam  Vitals and nursing note reviewed.   Constitutional:       Appearance: Normal appearance. She is well-developed. She is not ill-appearing or diaphoretic.   HENT:      Head: Normocephalic and atraumatic.      Right Ear: External ear normal.      Left Ear: External ear normal.   Eyes:      Conjunctiva/sclera: Conjunctivae normal.   Neck:      Trachea: No tracheal deviation.   Cardiovascular:      Comments: Bradycardic  Pulmonary:      Effort: Pulmonary effort is normal. No respiratory distress.   Abdominal:      General: There is no distension.   Musculoskeletal:         General: Swelling and tenderness present.   Skin:     General: Skin is warm and dry.      Coloration: Skin is not jaundiced or pale.   Neurological:      Mental Status: She is alert and oriented to person, place, and time.   Psychiatric:         Mood and Affect: Mood normal.         Behavior: Behavior normal.         Thought Content: Thought content normal.         Judgment: Judgment normal.           Large joint arthrocentesis: bilateral knee  Universal Protocol:  Consent: Verbal consent obtained.  Risks and benefits: risks, benefits and alternatives were discussed  Consent given by: patient  Time out: Immediately prior to procedure a \"time out\" was called to verify the correct patient, procedure, equipment, support " "staff and site/side marked as required.  Patient understanding: patient states understanding of the procedure being performed  Patient consent: the patient's understanding of the procedure matches consent given  Procedure consent: procedure consent matches procedure scheduled  Relevant documents: relevant documents present and verified  Test results: test results available and properly labeled  Site marked: the operative site was marked  Radiology Images displayed and confirmed. If images not available, report reviewed: imaging studies available  Required items: required blood products, implants, devices, and special equipment available  Patient identity confirmed: verbally with patient  Supporting Documentation  Indications: pain   Procedure Details  Location: knee - bilateral knee  Preparation: Patient was prepped and draped in the usual sterile fashion  Needle gauge: 21G 2\"  Ultrasound guidance: no  Approach: anteromedial    Medications (Right): 2 mL bupivacaine 0.25 %; 1 mL bupivacaine 0.25 %; 48 mg hylan 48 MG/6MLMedications (Left): 1 mL bupivacaine 0.25 %; 2 mL bupivacaine 0.25 %; 48 mg hylan 48 MG/6ML   Patient tolerance: patient tolerated the procedure well with no immediate complications  Dressing:  Sterile dressing applied                "

## 2024-10-08 ENCOUNTER — HOSPITAL ENCOUNTER (EMERGENCY)
Facility: HOSPITAL | Age: 79
Discharge: HOME | End: 2024-10-08
Attending: EMERGENCY MEDICINE
Payer: COMMERCIAL

## 2024-10-08 ENCOUNTER — APPOINTMENT (EMERGENCY)
Dept: RADIOLOGY | Facility: HOSPITAL | Age: 79
End: 2024-10-08
Payer: COMMERCIAL

## 2024-10-08 VITALS
HEIGHT: 61 IN | OXYGEN SATURATION: 97 % | SYSTOLIC BLOOD PRESSURE: 154 MMHG | BODY MASS INDEX: 33.22 KG/M2 | DIASTOLIC BLOOD PRESSURE: 74 MMHG | TEMPERATURE: 98.2 F | WEIGHT: 175.93 LBS | HEART RATE: 67 BPM | RESPIRATION RATE: 18 BRPM

## 2024-10-08 DIAGNOSIS — S29.9XXA INJURY OF CHEST WALL, INITIAL ENCOUNTER: Primary | ICD-10-CM
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
## 2024-10-08 LAB
2HR DELTA HS TROPONIN: 1 NG/L
ANION GAP SERPL CALCULATED.3IONS-SCNC: 9 MMOL/L (ref 4–13)
BASOPHILS # BLD AUTO: 0.06 THOUSANDS/ΜL (ref 0–0.1)
BASOPHILS NFR BLD AUTO: 1 % (ref 0–1)
BUN SERPL-MCNC: 24 MG/DL (ref 5–25)
CALCIUM SERPL-MCNC: 9.5 MG/DL (ref 8.4–10.2)
CARDIAC TROPONIN I PNL SERPL HS: 10 NG/L
CARDIAC TROPONIN I PNL SERPL HS: 11 NG/L
CHLORIDE SERPL-SCNC: 103 MMOL/L (ref 96–108)
CO2 SERPL-SCNC: 27 MMOL/L (ref 21–32)
CREAT SERPL-MCNC: 0.88 MG/DL (ref 0.6–1.3)
EOSINOPHIL # BLD AUTO: 0.1 THOUSAND/ΜL (ref 0–0.61)
EOSINOPHIL NFR BLD AUTO: 1 % (ref 0–6)
ERYTHROCYTE [DISTWIDTH] IN BLOOD BY AUTOMATED COUNT: 12.7 % (ref 11.6–15.1)
GFR SERPL CREATININE-BSD FRML MDRD: 63 ML/MIN/1.73SQ M
GLUCOSE SERPL-MCNC: 115 MG/DL (ref 65–140)
HCT VFR BLD AUTO: 40.8 % (ref 34.8–46.1)
HGB BLD-MCNC: 13.7 G/DL (ref 11.5–15.4)
IMM GRANULOCYTES # BLD AUTO: 0.02 THOUSAND/UL (ref 0–0.2)
IMM GRANULOCYTES NFR BLD AUTO: 0 % (ref 0–2)
LYMPHOCYTES # BLD AUTO: 2.15 THOUSANDS/ΜL (ref 0.6–4.47)
LYMPHOCYTES NFR BLD AUTO: 30 % (ref 14–44)
MCH RBC QN AUTO: 30.8 PG (ref 26.8–34.3)
MCHC RBC AUTO-ENTMCNC: 33.6 G/DL (ref 31.4–37.4)
MCV RBC AUTO: 92 FL (ref 82–98)
MONOCYTES # BLD AUTO: 0.57 THOUSAND/ΜL (ref 0.17–1.22)
MONOCYTES NFR BLD AUTO: 8 % (ref 4–12)
NEUTROPHILS # BLD AUTO: 4.35 THOUSANDS/ΜL (ref 1.85–7.62)
NEUTS SEG NFR BLD AUTO: 60 % (ref 43–75)
NRBC BLD AUTO-RTO: 0 /100 WBCS
PLATELET # BLD AUTO: 213 THOUSANDS/UL (ref 149–390)
PMV BLD AUTO: 12.2 FL (ref 8.9–12.7)
POTASSIUM SERPL-SCNC: 3.7 MMOL/L (ref 3.5–5.3)
RBC # BLD AUTO: 4.45 MILLION/UL (ref 3.81–5.12)
SODIUM SERPL-SCNC: 139 MMOL/L (ref 135–147)
WBC # BLD AUTO: 7.25 THOUSAND/UL (ref 4.31–10.16)

## 2024-10-08 PROCEDURE — 80048 BASIC METABOLIC PNL TOTAL CA: CPT | Performed by: EMERGENCY MEDICINE

## 2024-10-08 PROCEDURE — 93005 ELECTROCARDIOGRAM TRACING: CPT

## 2024-10-08 PROCEDURE — 85025 COMPLETE CBC W/AUTO DIFF WBC: CPT | Performed by: EMERGENCY MEDICINE

## 2024-10-08 PROCEDURE — 84484 ASSAY OF TROPONIN QUANT: CPT | Performed by: EMERGENCY MEDICINE

## 2024-10-08 PROCEDURE — 99285 EMERGENCY DEPT VISIT HI MDM: CPT | Performed by: EMERGENCY MEDICINE

## 2024-10-08 PROCEDURE — 36415 COLL VENOUS BLD VENIPUNCTURE: CPT | Performed by: EMERGENCY MEDICINE

## 2024-10-08 PROCEDURE — 71046 X-RAY EXAM CHEST 2 VIEWS: CPT

## 2024-10-08 PROCEDURE — 99284 EMERGENCY DEPT VISIT MOD MDM: CPT

## 2024-10-08 RX ORDER — ACETAMINOPHEN 325 MG/1
650 TABLET ORAL ONCE
Status: COMPLETED | OUTPATIENT
Start: 2024-10-08 | End: 2024-10-08

## 2024-10-08 RX ADMIN — ACETAMINOPHEN 650 MG: 325 TABLET ORAL at 17:36

## 2024-10-08 NOTE — ED PROVIDER NOTES
Final diagnoses:   None     ED Disposition       None          Assessment & Plan       Medical Decision Making  79-year-old female, presents with chest wall injury after fall.  Differential diagnosis includes contusion, pneumothorax, cardiac contusion, rib fracture among other diagnoses.  Patient looks well and in no distress, normal respiratory effort, no bruising or signs of trauma on exam.  Noted to be bradycardic on cardiac monitor, patient states that her heart rate is usually low.  EKG, labs, chest x-ray ordered.    Patient monitored in ED, remains comfortable with no respiratory symptoms.  0 and 2-hour troponin with no significant elevation, patient able to get out of bed and ambulate in ED.    Amount and/or Complexity of Data Reviewed  Labs: ordered. Decision-making details documented in ED Course.  Radiology: ordered and independent interpretation performed. Decision-making details documented in ED Course.  ECG/medicine tests: ordered and independent interpretation performed. Decision-making details documented in ED Course.    Risk  OTC drugs.        ED Course as of 10/08/24 1850   Tue Oct 08, 2024   1650 Chest x-ray dependently reviewed myself, no pneumothorax or effusion, no acute findings.   1850 2-hour troponin with no significant change.  Patient comfortable, feels well would like to be discharged, will call family member to come pick her up.       Medications - No data to display    ED Risk Strat Scores                   Identification of Seniors at Risk      Flowsheet Row Most Recent Value   (ISAR) Identification of Seniors at Risk    Before the illness or injury that brought you to the Emergency, did you need someone to help you on a regular basis? 0 Filed at: 10/08/2024 1526   In the last 24 hours, have you needed more help than usual? 0 Filed at: 10/08/2024 1526   Have you been hospitalized for one or more nights during the past 6 months? 0 Filed at: 10/08/2024 1526   In general, do you see well?  0 Filed at: 10/08/2024 1526   In general, do you have serious problems with your memory? 0 Filed at: 10/08/2024 1526   Do you take more than three different medications every day? 1 Filed at: 10/08/2024 1526   ISAR Score 1 Filed at: 10/08/2024 1526                SBIRT 22yo+      Flowsheet Row Most Recent Value   Initial Alcohol Screen:  AUDIT-C     1. How often do you have a drink containing alcohol? 0 Filed at: 10/08/2024 1526   2. How many drinks containing alcohol do you have on a typical day you are drinking?  0 Filed at: 10/08/2024 1526   3b. FEMALE Any Age, or MALE 65+: How often do you have 4 or more drinks on one occassion? 0 Filed at: 10/08/2024 1526   Audit-C Score 0 Filed at: 10/08/2024 1526   GENA: How many times in the past year have you...    Used an illegal drug or used a prescription medication for non-medical reasons? Never Filed at: 10/08/2024 1526                            History of Present Illness       Chief Complaint   Patient presents with    Fall     Per EMS patient was exiting the car at five below when she had a mechanical fall tripping over her shoes. Patient reports hitting the concrete with her chest. Patient reports chest pain, pressure and pain to left side of chest.  denies SOB, light headedness, LOC or H/S. +ASA usage. Patient A&0x4.       Past Medical History:   Diagnosis Date    Anxiety     GERD (gastroesophageal reflux disease)     High cholesterol     Hypertension     Myocardial infarction (HCC)     Osteopenia       Past Surgical History:   Procedure Laterality Date    CARDIAC SURGERY  12/03/2018    double bypass surgery     CHOLECYSTECTOMY      FOOT SURGERY Bilateral     TONSILLECTOMY        History reviewed. No pertinent family history.   Social History     Tobacco Use    Smoking status: Never    Smokeless tobacco: Never   Vaping Use    Vaping status: Never Used   Substance Use Topics    Alcohol use: Not Currently    Drug use: Never      E-Cigarette/Vaping    E-Cigarette  Use Never User       E-Cigarette/Vaping Substances      I have reviewed and agree with the history as documented.     79-year-old female, presents with chest injury after fall prior to arrival.  Patient slipped and fell forward onto her chest after getting out of car.  Denies any head injury or loss consciousness.  Patient reporting some mild pain in the left chest.  Denies any shortness of breath, lightheadedness, abdominal pain, or extremity pain.      History provided by:  Patient   used: No    Fall  Associated symptoms: no headaches        Review of Systems   Constitutional: Negative.    Respiratory: Negative.  Negative for shortness of breath.    Cardiovascular:  Negative for palpitations.   Gastrointestinal: Negative.    Neurological: Negative.  Negative for dizziness, light-headedness and headaches.           Objective       ED Triage Vitals   Temperature Pulse Blood Pressure Respirations SpO2 Patient Position - Orthostatic VS   10/08/24 1526 10/08/24 1526 10/08/24 1528 10/08/24 1526 10/08/24 1526 10/08/24 1526   98.2 °F (36.8 °C) (!) 47 (!) 209/93 20 96 % Lying      Temp Source Heart Rate Source BP Location FiO2 (%) Pain Score    10/08/24 1526 10/08/24 1526 10/08/24 1526 -- 10/08/24 1526    Oral Monitor Left arm  6      Vitals      Date and Time Temp Pulse SpO2 Resp BP Pain Score FACES Pain Rating User   10/08/24 1528 -- -- -- -- 209/93 -- -- KM   10/08/24 1526 98.2 °F (36.8 °C) 47 96 % 20 -- 6 -- KM            Physical Exam  Vitals and nursing note reviewed.   Constitutional:       General: She is not in acute distress.  HENT:      Head: Normocephalic and atraumatic.   Eyes:      Extraocular Movements: Extraocular movements intact.      Pupils: Pupils are equal, round, and reactive to light.   Cardiovascular:      Rate and Rhythm: Regular rhythm. Bradycardia present.      Pulses: Normal pulses.   Pulmonary:      Effort: Pulmonary effort is normal.      Breath sounds: Normal breath  sounds.   Chest:          Comments: Mild tenderness to left upper chest wall, no swelling no deformity, no crepitus  Musculoskeletal:         General: No tenderness or deformity. Normal range of motion.      Cervical back: Normal range of motion and neck supple. No rigidity or tenderness.   Skin:     General: Skin is warm and dry.   Neurological:      General: No focal deficit present.      Mental Status: She is alert and oriented to person, place, and time.      Motor: No weakness.         Results Reviewed       Procedure Component Value Units Date/Time    CBC and differential [[id number]]     Lab Status: No result Specimen: Blood     Basic metabolic panel [[id number]]     Lab Status: No result Specimen: Blood     HS Troponin 0hr (reflex protocol) [[id number]]     Lab Status: No result Specimen: Blood             XR chest 2 views    (Results Pending)       ECG 12 Lead Documentation Only    Date/Time: 10/8/2024 3:38 PM    Performed by: Chad Arthur MD  Authorized by: Chad Arthur MD    ECG reviewed by me, the ED Provider: yes    Patient location:  ED  Rate:     ECG rate assessment: normal    Rhythm:     Rhythm: sinus rhythm    Ectopy:     Ectopy: PAC    QRS:     QRS axis:  Normal    QRS intervals:  Normal  Conduction:     Conduction: normal    Other findings:     Other findings: prolonged qTc interval    Comments:      Sinus rhythm with PACs, ventricular rate 67      ED Medication and Procedure Management   Prior to Admission Medications   Prescriptions Last Dose Informant Patient Reported? Taking?   Butalbital-APAP-Caffeine -40 MG CAPS  Self Yes No   Sig: butalbital-acetaminophen-caffeine 50 mg-300 mg-40 mg capsule   Calcium Carbonate 500 MG CHEW  Self Yes No   Sig: Chew 2 tablets   Diclofenac Sodium (VOLTAREN) 1 %  Self No No   Sig: Apply 2 g topically 4 (four) times a day   FLUoxetine (PROzac) 20 mg capsule  Self Yes No   Sig: Take 20 mg by mouth daily   alendronate (FOSAMAX) 70 mg tablet  Self Yes No    Sig: alendronate 70 mg tablet   aspirin 81 mg chewable tablet  Self Yes No   Sig: Chew 81 mg daily   atorvastatin (LIPITOR) 40 mg tablet  Self Yes No   Sig: atorvastatin 40 mg tablet   bisacodyl (DULCOLAX) 10 mg suppository  Self Yes No   Sig: Insert 10 mg into the rectum   cloNIDine (CATAPRES) 0.1 mg tablet  Self Yes No   Sig: clonidine HCl 0.1 mg tablet   famotidine (PEPCID) 40 MG tablet  Self Yes No   Sig: famotidine 40 mg tablet   hydrochlorothiazide (HYDRODIURIL) 25 mg tablet  Self Yes No   Sig: Take 25 mg by mouth daily   latanoprost (XALATAN) 0.005 % ophthalmic solution  Self Yes No   Sig: Administer 1 drop to both eyes daily at bedtime   lisinopril (ZESTRIL) 20 mg tablet  Self Yes No   Sig: Take 20 mg by mouth 2 (two) times a day   losartan-hydrochlorothiazide (HYZAAR) 100-12.5 MG per tablet  Self Yes No   Sig: losartan 100 mg-hydrochlorothiazide 12.5 mg tablet   magnesium oxide (MAG-OX) 400 mg  Self Yes No   Sig: Take 400 mg by mouth 2 (two) times a day   meloxicam (MOBIC) 7.5 mg tablet  Self Yes No   Sig: Take 7.5 mg by mouth daily   methocarbamol (ROBAXIN) 500 mg tablet  Self Yes No   Sig: TAKE 1 TABLET BY MOUTH EVERY DAY AT BEDTIME FOR 7 DAYS   metoprolol succinate (TOPROL-XL) 50 mg 24 hr tablet  Self Yes No   Sig: Take 50 mg by mouth daily   naproxen (NAPROSYN) 500 mg tablet  Self Yes No   Sig: TAKE 1 TABLET (500 MG TOTAL) BY MOUTH EVERY 12 (TWELVE) HOURS AS NEEDED (FOR PAIN). TAKE WITH FOOD.   pantoprazole (PROTONIX) 40 mg tablet  Self Yes No   Sig: pantoprazole 40 mg tablet,delayed release   tiZANidine (ZANAFLEX) 4 mg tablet  Self No No   Sig: Take 1 tablet (4 mg total) by mouth 3 (three) times a day as needed for muscle spasms   topiramate (TOPAMAX) 50 MG tablet  Self Yes No   Sig: Take 1 tab nightly for a week and then 1 tab twice a day      Facility-Administered Medications: None     Patient's Medications   Discharge Prescriptions    No medications on file     No discharge procedures on file.  ED  SEPSIS DOCUMENTATION            Chad Arthur MD  10/08/24 8004

## 2024-10-09 LAB
ATRIAL RATE: 50 BPM
P AXIS: 61 DEGREES
PR INTERVAL: 204 MS
QRS AXIS: 39 DEGREES
QRSD INTERVAL: 92 MS
QT INTERVAL: 452 MS
QTC INTERVAL: 412 MS
T WAVE AXIS: -45 DEGREES
VENTRICULAR RATE: 50 BPM

## 2024-10-09 PROCEDURE — 93010 ELECTROCARDIOGRAM REPORT: CPT | Performed by: INTERNAL MEDICINE

## 2024-10-10 LAB
ATRIAL RATE: 48 BPM
P AXIS: 70 DEGREES
QRS AXIS: 39 DEGREES
QRSD INTERVAL: 88 MS
QT INTERVAL: 482 MS
QTC INTERVAL: 509 MS
T WAVE AXIS: 40 DEGREES
VENTRICULAR RATE: 67 BPM

## 2024-10-10 PROCEDURE — 93010 ELECTROCARDIOGRAM REPORT: CPT | Performed by: INTERNAL MEDICINE

## 2024-11-11 ENCOUNTER — OFFICE VISIT (OUTPATIENT)
Dept: OBGYN CLINIC | Facility: CLINIC | Age: 79
End: 2024-11-11
Payer: COMMERCIAL

## 2024-11-11 VITALS
HEIGHT: 61 IN | HEART RATE: 54 BPM | SYSTOLIC BLOOD PRESSURE: 150 MMHG | DIASTOLIC BLOOD PRESSURE: 79 MMHG | WEIGHT: 168 LBS | OXYGEN SATURATION: 95 % | BODY MASS INDEX: 31.72 KG/M2

## 2024-11-11 DIAGNOSIS — M17.0 PRIMARY OSTEOARTHRITIS OF BOTH KNEES: Primary | ICD-10-CM

## 2024-11-11 PROCEDURE — 20610 DRAIN/INJ JOINT/BURSA W/O US: CPT | Performed by: FAMILY MEDICINE

## 2024-11-11 PROCEDURE — 99213 OFFICE O/P EST LOW 20 MIN: CPT | Performed by: FAMILY MEDICINE

## 2024-11-11 RX ORDER — CETIRIZINE HYDROCHLORIDE 10 MG/1
10 TABLET ORAL DAILY
COMMUNITY
Start: 2024-07-12

## 2024-11-11 RX ORDER — TRIAMCINOLONE ACETONIDE 40 MG/ML
80 INJECTION, SUSPENSION INTRA-ARTICULAR; INTRAMUSCULAR
Status: COMPLETED | OUTPATIENT
Start: 2024-11-11 | End: 2024-11-11

## 2024-11-11 RX ORDER — METOPROLOL TARTRATE 25 MG/1
1 TABLET, FILM COATED ORAL 2 TIMES DAILY
COMMUNITY
Start: 2024-10-29

## 2024-11-11 RX ORDER — BUPIVACAINE HYDROCHLORIDE 2.5 MG/ML
1 INJECTION, SOLUTION INFILTRATION; PERINEURAL
Status: COMPLETED | OUTPATIENT
Start: 2024-11-11 | End: 2024-11-11

## 2024-11-11 RX ORDER — BUPIVACAINE HYDROCHLORIDE 2.5 MG/ML
4 INJECTION, SOLUTION INFILTRATION; PERINEURAL
Status: COMPLETED | OUTPATIENT
Start: 2024-11-11 | End: 2024-11-11

## 2024-11-11 RX ORDER — AMLODIPINE BESYLATE 10 MG/1
10 TABLET ORAL DAILY
COMMUNITY
Start: 2024-11-04 | End: 2025-10-30

## 2024-11-11 RX ADMIN — BUPIVACAINE HYDROCHLORIDE 1 ML: 2.5 INJECTION, SOLUTION INFILTRATION; PERINEURAL at 14:30

## 2024-11-11 RX ADMIN — BUPIVACAINE HYDROCHLORIDE 4 ML: 2.5 INJECTION, SOLUTION INFILTRATION; PERINEURAL at 14:30

## 2024-11-11 RX ADMIN — TRIAMCINOLONE ACETONIDE 80 MG: 40 INJECTION, SUSPENSION INTRA-ARTICULAR; INTRAMUSCULAR at 14:30

## 2024-11-11 NOTE — PROGRESS NOTES
Assessment/Plan:  Assessment & Plan   Diagnoses and all orders for this visit:    Primary osteoarthritis of both knees  -     Large joint arthrocentesis: bilateral knee  -     Injection Procedure Prior Authorization; Future    Other orders  -     amLODIPine (NORVASC) 10 mg tablet; Take 10 mg by mouth daily  -     cetirizine (ZyrTEC Allergy) 10 mg tablet; Take 10 mg by mouth daily  -     metoprolol tartrate (LOPRESSOR) 25 mg tablet; Take 1 tablet by mouth 2 (two) times a day        79-year-old female with osteoarthritis of both knees with pain both knees many years duration.  Clinical impression is that she has symptoms from degenerative changes.  She experiences significant relief with Visco injection so offered patient repeat visco injection to which she agreed.  Will request for 1 shot visco injection both knees to be done after 12/18/2024.  In interim I offered steroid injection for immediate relief.  I administered mixtures of 3 cc 0.25% bupivacaine and 2 cc Kenalog to each knee without complication.  She will return for Visco injections once approved.  She was advised we will likely have her consult with orthopedic surgeon to discuss further treatment options.          Subjective:   Patient ID: Grecia Robles is a 79 y.o. female.  Chief Complaint   Patient presents with    Right Knee - Follow-up    Left Knee - Follow-up        79-year-old female with osteoarthritis of both knees following up for pain both knees many years duration.  She was last seen by me 5 months ago at which point she was given visco injection to both knees.  She reports that following visco injection she has significant proved and was doing very well until about 1 month ago.  Since then pain both knees started to return.  She has pain described generalized to the knees, achy and sore, nonradiating, worse with bearing weight and ambulating, associated with swelling, fluctuating intensity and rated as 7 out of 10 at worst, and  "improved with resting.    Knee Pain  This is a chronic problem. The current episode started more than 1 year ago. The problem occurs daily. The problem has been gradually worsening. Associated symptoms include arthralgias and joint swelling. Pertinent negatives include no numbness or weakness. The symptoms are aggravated by standing and walking. She has tried rest and position changes for the symptoms. The treatment provided mild relief.               Review of Systems   Musculoskeletal:  Positive for arthralgias and joint swelling.   Neurological:  Negative for weakness and numbness.       Objective:  Vitals:    11/11/24 1451   BP: 150/79   Pulse: (!) 54   SpO2: 95%   Weight: 76.2 kg (168 lb)   Height: 5' 1\" (1.549 m)      Right Knee Exam     Tenderness   The patient is experiencing tenderness in the medial joint line.    Range of Motion   Extension:  normal     Other   Swelling: mild      Left Knee Exam     Tenderness   The patient is experiencing tenderness in the medial joint line.    Range of Motion   Extension:  normal     Other   Swelling: mild            Physical Exam  Vitals and nursing note reviewed.   Constitutional:       Appearance: Normal appearance. She is well-developed. She is not ill-appearing or diaphoretic.   HENT:      Head: Normocephalic and atraumatic.      Right Ear: External ear normal.      Left Ear: External ear normal.   Eyes:      Conjunctiva/sclera: Conjunctivae normal.   Neck:      Trachea: No tracheal deviation.   Cardiovascular:      Comments: Bradycardic  Pulmonary:      Effort: Pulmonary effort is normal. No respiratory distress.   Abdominal:      General: There is no distension.   Skin:     General: Skin is warm and dry.      Coloration: Skin is not jaundiced or pale.   Neurological:      Mental Status: She is alert and oriented to person, place, and time.   Psychiatric:         Mood and Affect: Mood normal.         Behavior: Behavior normal.         Thought Content: Thought " "content normal.         Judgment: Judgment normal.             Large joint arthrocentesis: bilateral knee  Universal Protocol:  procedure performed by consultantConsent: Verbal consent obtained.  Risks and benefits: risks, benefits and alternatives were discussed  Consent given by: patient  Time out: Immediately prior to procedure a \"time out\" was called to verify the correct patient, procedure, equipment, support staff and site/side marked as required.  Patient understanding: patient states understanding of the procedure being performed  Patient consent: the patient's understanding of the procedure matches consent given  Procedure consent: procedure consent matches procedure scheduled  Relevant documents: relevant documents present and verified  Test results: test results available and properly labeled  Site marked: the operative site was marked  Radiology Images displayed and confirmed. If images not available, report reviewed: imaging studies available  Required items: required blood products, implants, devices, and special equipment available  Patient identity confirmed: verbally with patient  Supporting Documentation  Indications: pain   Procedure Details  Location: knee - bilateral knee  Preparation: Patient was prepped and draped in the usual sterile fashion  Needle gauge: 21G 2\"  Ultrasound guidance: no  Approach: anteromedial    Medications (Right): 4 mL bupivacaine 0.25 %; 1 mL bupivacaine 0.25 %; 80 mg triamcinolone acetonide 40 mg/mLMedications (Left): 1 mL bupivacaine 0.25 %; 4 mL bupivacaine 0.25 %; 80 mg triamcinolone acetonide 40 mg/mL   Patient tolerance: patient tolerated the procedure well with no immediate complications  Dressing:  Sterile dressing applied              "

## 2025-05-23 ENCOUNTER — PROCEDURE VISIT (OUTPATIENT)
Dept: OBGYN CLINIC | Facility: CLINIC | Age: 80
End: 2025-05-23

## 2025-05-23 VITALS — WEIGHT: 168 LBS | RESPIRATION RATE: 18 BRPM | HEIGHT: 61 IN | BODY MASS INDEX: 31.72 KG/M2

## 2025-05-23 DIAGNOSIS — M17.0 PRIMARY OSTEOARTHRITIS OF BOTH KNEES: Primary | ICD-10-CM

## 2025-05-23 RX ORDER — BUPIVACAINE HYDROCHLORIDE 2.5 MG/ML
2 INJECTION, SOLUTION INFILTRATION; PERINEURAL
Status: COMPLETED | OUTPATIENT
Start: 2025-05-23 | End: 2025-05-23

## 2025-05-23 RX ORDER — BUPIVACAINE HYDROCHLORIDE 2.5 MG/ML
1 INJECTION, SOLUTION INFILTRATION; PERINEURAL
Status: COMPLETED | OUTPATIENT
Start: 2025-05-23 | End: 2025-05-23

## 2025-05-23 RX ORDER — CYCLOSPORINE 0.5 MG/ML
1 EMULSION OPHTHALMIC 2 TIMES DAILY
COMMUNITY
Start: 2025-04-22

## 2025-05-23 RX ADMIN — BUPIVACAINE HYDROCHLORIDE 1 ML: 2.5 INJECTION, SOLUTION INFILTRATION; PERINEURAL at 13:15

## 2025-05-23 RX ADMIN — BUPIVACAINE HYDROCHLORIDE 2 ML: 2.5 INJECTION, SOLUTION INFILTRATION; PERINEURAL at 13:15

## 2025-05-23 NOTE — ASSESSMENT & PLAN NOTE
79-year-old female with osteoarthritis of both knees with pain both knees many years duration.  Clinical impression is that she has symptoms from degenerative changes.  She agreed to proceed with visco injection.  I administered Synvisc 1 to each knee without complication.  She will follow-up as needed.  Orders:    Large joint arthrocentesis: bilateral knee

## 2025-05-23 NOTE — PROGRESS NOTES
"Name: Grecia Robles      : 1945      MRN: [id number]  Encounter Provider: Socrates Whalen DO  Encounter Date: 2025   Encounter department: [facility] [place]  :  Assessment & Plan  Primary osteoarthritis of both knees  79-year-old female with osteoarthritis of both knees with pain both knees many years duration.  Clinical impression is that she has symptoms from degenerative changes.  She agreed to proceed with visco injection.  I administered Synvisc 1 to each knee without complication.  She will follow-up as needed.  Orders:    Large joint arthrocentesis: bilateral knee      Large joint arthrocentesis: bilateral knee    Performed by: Socrates Whalen DO  Authorized by: Socrates Whalen DO    Universal Protocol:  procedure performed by consultantConsent: Verbal consent obtained  Risks and benefits: risks, benefits and alternatives were discussed  Consent given by: patient  Time out: Immediately prior to procedure a \"time out\" was called to verify the correct patient, procedure, equipment, support staff and site/side marked as required.  Patient understanding: patient states understanding of the procedure being performed  Patient consent: the patient's understanding of the procedure matches consent given  Procedure consent: procedure consent matches procedure scheduled  Relevant documents: relevant documents present and verified  Test results: test results available and properly labeled  Site marked: the operative site was marked  Radiology Images displayed and confirmed. If images not available, report reviewed: imaging studies available  Required items: required blood products, implants, devices, and special equipment available  Patient identity confirmed: verbally with patient  Supporting Documentation  Indications: pain     Is this a Visco injection? Yes  Non-Pharmacologic Treatments Attempted: Physical " "Therapy  Pharmacologic Treatments Attempted: Meloxicam, Tylenol  Pain Score: 7Procedure Details  Location: knee - bilateral knee  Preparation: Patient was prepped and draped in the usual sterile fashion  Needle gauge: 21G 2\"  Ultrasound guidance: no  Approach: anteromedial    Medications (Right): 1 mL bupivacaine 0.25 %; 2 mL bupivacaine 0.25 %; 48 mg hylan 48 MG/6MLMedications (Left): 1 mL bupivacaine 0.25 %; 2 mL bupivacaine 0.25 %; 48 mg hylan 48 MG/6ML   Patient tolerance: patient tolerated the procedure well with no immediate complications  Dressing:  Sterile dressing applied          History of Present Illness   Chief Complaint   Patient presents with    Left Knee - Follow-up, Pain, Swelling      HPI  Grecia Robles is a 79 y.o. female with osteoarthritis of both knees who presents follow-up pain both knees many years duration.  She was last seen by me 6.5 months ago at which point she was given steroid injection to both knees and we requested for visco injection.  She presents today for visco injection both knees.  She reports having pain described as generalized to the knees, achy and sore, worse with bearing weight and ambulating, associated with swelling, fluctuating intensity and symptoms greater than 7 out of 10, and improved with resting.    History obtained from: patient    Review of Systems   Musculoskeletal:  Positive for arthralgias and joint swelling.   Neurological:  Negative for weakness and numbness.          Objective   Resp 18   Ht 5' 1\" (1.549 m)   Wt 76.2 kg (168 lb)   BMI 31.74 kg/m²      Physical Exam  Vitals and nursing note reviewed.   Constitutional:       Appearance: Normal appearance. She is well-developed. She is not ill-appearing or diaphoretic.   HENT:      Head: Normocephalic and atraumatic.      Right Ear: External ear normal.      Left Ear: External ear normal.     Eyes:      Conjunctiva/sclera: Conjunctivae normal.     Neck:      Trachea: No tracheal deviation. "   Pulmonary:      Effort: Pulmonary effort is normal. No respiratory distress.   Abdominal:      General: There is no distension.     Skin:     General: Skin is warm and dry.      Coloration: Skin is not jaundiced or pale.     Neurological:      Mental Status: She is alert and oriented to person, place, and time.     Psychiatric:         Mood and Affect: Mood normal.         Behavior: Behavior normal.         Thought Content: Thought content normal.         Judgment: Judgment normal.

## 2025-08-20 ENCOUNTER — OFFICE VISIT (OUTPATIENT)
Dept: OBGYN CLINIC | Facility: CLINIC | Age: 80
End: 2025-08-20
Payer: COMMERCIAL

## 2025-08-20 VITALS — WEIGHT: 176 LBS | BODY MASS INDEX: 33.23 KG/M2 | HEIGHT: 61 IN

## 2025-08-20 DIAGNOSIS — M23.92 INTERNAL DERANGEMENT OF LEFT KNEE: Primary | ICD-10-CM

## 2025-08-20 DIAGNOSIS — M17.0 PRIMARY OSTEOARTHRITIS OF BOTH KNEES: ICD-10-CM

## 2025-08-20 DIAGNOSIS — F40.240 CLAUSTROPHOBIA: ICD-10-CM

## 2025-08-20 PROCEDURE — 99214 OFFICE O/P EST MOD 30 MIN: CPT | Performed by: FAMILY MEDICINE

## 2025-08-20 RX ORDER — ALPRAZOLAM 0.5 MG
0.5 TABLET ORAL
Qty: 2 TABLET | Refills: 0 | Status: SHIPPED | OUTPATIENT
Start: 2025-08-20